# Patient Record
Sex: FEMALE | Race: NATIVE HAWAIIAN OR OTHER PACIFIC ISLANDER | NOT HISPANIC OR LATINO | Employment: UNEMPLOYED | URBAN - METROPOLITAN AREA
[De-identification: names, ages, dates, MRNs, and addresses within clinical notes are randomized per-mention and may not be internally consistent; named-entity substitution may affect disease eponyms.]

---

## 2017-01-03 ENCOUNTER — HOSPITAL ENCOUNTER (OUTPATIENT)
Dept: LAB | Facility: MEDICAL CENTER | Age: 67
End: 2017-01-03
Attending: NURSE PRACTITIONER
Payer: MEDICARE

## 2017-01-03 ENCOUNTER — OFFICE VISIT (OUTPATIENT)
Dept: MEDICAL GROUP | Facility: MEDICAL CENTER | Age: 67
End: 2017-01-03
Payer: MEDICARE

## 2017-01-03 VITALS
HEIGHT: 55 IN | RESPIRATION RATE: 16 BRPM | DIASTOLIC BLOOD PRESSURE: 72 MMHG | TEMPERATURE: 97.8 F | HEART RATE: 60 BPM | WEIGHT: 126 LBS | SYSTOLIC BLOOD PRESSURE: 110 MMHG | BODY MASS INDEX: 29.16 KG/M2 | OXYGEN SATURATION: 97 %

## 2017-01-03 DIAGNOSIS — R53.83 OTHER FATIGUE: ICD-10-CM

## 2017-01-03 DIAGNOSIS — R60.0 EDEMA OF LEFT LOWER EXTREMITY: ICD-10-CM

## 2017-01-03 DIAGNOSIS — R10.31 RIGHT LOWER QUADRANT ABDOMINAL PAIN: ICD-10-CM

## 2017-01-03 DIAGNOSIS — R30.0 DYSURIA: ICD-10-CM

## 2017-01-03 DIAGNOSIS — I63.81 LACUNAR INFARCTION (HCC): ICD-10-CM

## 2017-01-03 LAB
ALBUMIN SERPL BCP-MCNC: 4.2 G/DL (ref 3.2–4.9)
ALBUMIN/GLOB SERPL: 1.4 G/DL
ALP SERPL-CCNC: 72 U/L (ref 30–99)
ALT SERPL-CCNC: 18 U/L (ref 2–50)
ANION GAP SERPL CALC-SCNC: 9 MMOL/L (ref 0–11.9)
APPEARANCE UR: CLEAR
AST SERPL-CCNC: 21 U/L (ref 12–45)
BACTERIA #/AREA URNS HPF: ABNORMAL /HPF
BASOPHILS # BLD AUTO: 0.03 K/UL (ref 0–0.12)
BASOPHILS NFR BLD AUTO: 0.4 % (ref 0–1.8)
BILIRUB SERPL-MCNC: 0.6 MG/DL (ref 0.1–1.5)
BILIRUB UR QL STRIP.AUTO: NEGATIVE
BUN SERPL-MCNC: 12 MG/DL (ref 8–22)
CALCIUM SERPL-MCNC: 9.5 MG/DL (ref 8.5–10.5)
CHLORIDE SERPL-SCNC: 107 MMOL/L (ref 96–112)
CO2 SERPL-SCNC: 23 MMOL/L (ref 20–33)
COLOR UR AUTO: COLORLESS
CREAT SERPL-MCNC: 0.55 MG/DL (ref 0.5–1.4)
CULTURE IF INDICATED INDCX: YES UA CULTURE
EOSINOPHIL # BLD: 0.04 K/UL (ref 0–0.51)
EOSINOPHIL NFR BLD AUTO: 0.5 % (ref 0–6.9)
EPITHELIAL CELLS 1715: ABNORMAL /HPF
ERYTHROCYTE [DISTWIDTH] IN BLOOD BY AUTOMATED COUNT: 44.1 FL (ref 35.9–50)
GLOBULIN SER CALC-MCNC: 2.9 G/DL (ref 1.9–3.5)
GLUCOSE SERPL-MCNC: 137 MG/DL (ref 65–99)
GLUCOSE UR STRIP.AUTO-MCNC: NEGATIVE MG/DL
HCT VFR BLD AUTO: 44.9 % (ref 37–47)
HGB BLD-MCNC: 15.1 G/DL (ref 12–16)
IMM GRANULOCYTES # BLD AUTO: 0.03 K/UL (ref 0–0.11)
IMM GRANULOCYTES NFR BLD AUTO: 0.4 % (ref 0–0.9)
KETONES UR STRIP.AUTO-MCNC: NEGATIVE MG/DL
LEUKOCYTE ESTERASE UR QL STRIP.AUTO: ABNORMAL
LYMPHOCYTES # BLD: 1.37 K/UL (ref 1–4.8)
LYMPHOCYTES NFR BLD AUTO: 16.7 % (ref 22–41)
MCH RBC QN AUTO: 29.5 PG (ref 27–33)
MCHC RBC AUTO-ENTMCNC: 33.6 G/DL (ref 33.6–35)
MCV RBC AUTO: 87.7 FL (ref 81.4–97.8)
MICRO URNS: ABNORMAL
MONOCYTES # BLD: 0.55 K/UL (ref 0–0.85)
MONOCYTES NFR BLD AUTO: 6.7 % (ref 0–13.4)
NEUTROPHILS # BLD: 6.16 K/UL (ref 2–7.15)
NEUTROPHILS NFR BLD AUTO: 75.3 % (ref 44–72)
NITRITE UR QL STRIP.AUTO: NEGATIVE
NRBC # BLD AUTO: 0 K/UL
NRBC BLD-RTO: 0 /100 WBC
PH UR: 7 [PH]
PLATELET # BLD AUTO: 202 K/UL (ref 164–446)
PMV BLD AUTO: 10.6 FL (ref 9–12.9)
POTASSIUM SERPL-SCNC: 3.7 MMOL/L (ref 3.6–5.5)
PROT SERPL-MCNC: 7.1 G/DL (ref 6–8.2)
PROT UR QL STRIP: NEGATIVE MG/DL
RBC # BLD AUTO: 5.12 M/UL (ref 4.2–5.4)
RBC #/AREA URNS HPF: ABNORMAL /HPF
RBC UR QL AUTO: NEGATIVE
SODIUM SERPL-SCNC: 139 MMOL/L (ref 135–145)
SP GR UR STRIP.AUTO: 1
TRANS CELLS URNS QL MICRO: ABNORMAL /HPF
TSH SERPL DL<=0.005 MIU/L-ACNC: 1.58 UIU/ML (ref 0.3–3.7)
WBC # BLD AUTO: 8.2 K/UL (ref 4.8–10.8)
WBC #/AREA URNS HPF: ABNORMAL /HPF

## 2017-01-03 PROCEDURE — 99214 OFFICE O/P EST MOD 30 MIN: CPT | Performed by: NURSE PRACTITIONER

## 2017-01-03 PROCEDURE — 87086 URINE CULTURE/COLONY COUNT: CPT

## 2017-01-03 PROCEDURE — 84443 ASSAY THYROID STIM HORMONE: CPT

## 2017-01-03 PROCEDURE — 80053 COMPREHEN METABOLIC PANEL: CPT

## 2017-01-03 PROCEDURE — 36415 COLL VENOUS BLD VENIPUNCTURE: CPT

## 2017-01-03 PROCEDURE — 85025 COMPLETE CBC W/AUTO DIFF WBC: CPT

## 2017-01-03 PROCEDURE — 81001 URINALYSIS AUTO W/SCOPE: CPT

## 2017-01-03 ASSESSMENT — ENCOUNTER SYMPTOMS
EDEMA: 1
ABDOMINAL PAIN: 1

## 2017-01-03 NOTE — MR AVS SNAPSHOT
"        Jodee Basilio   1/3/2017 3:40 PM   Office Visit   MRN: [id number]    Department:  [address] [place] [address]   Dept Phone:  [phone]    Description:  Female : 1950   Provider:  FIOR Lieberman           Reason for Visit     Edema left leg x 1 week    Other chest weezing       Allergies as of 1/3/2017     No Known Allergies      You were diagnosed with     Right lower quadrant abdominal pain   [777792]       Edema of left lower extremity   [028188]       Dysuria   [788.1.ICD-9-CM]       Lacunar infarction (HCC)   [263924]       Other fatigue   [4611648]         Vital Signs     Blood Pressure Pulse Temperature Respirations Height Weight    110/72 mmHg 60 36.6 °C (97.8 °F) 16 1.397 m (4' 7\") 57.153 kg (126 lb)    Body Mass Index Oxygen Saturation Smoking Status             29.29 kg/m2 97% Never Smoker          Basic Information     Date Of Birth Sex Race Ethnicity Preferred Language    1950 Female  or other  Non- English      Your appointments     2017  3:00 PM   Established Patient with Crystal Cantor M.D.   [facility] [address]    [address] [facility] [address]  [facility] [id number] [phone]           You will be receiving a confirmation call a few days before your appointment from our automated call confirmation system.              Problem List              ICD-10-CM Priority Class Noted - Resolved    Hallux valgus, acquired M20.10   2014 - Present    Developmental delay (Chronic) R62.50   2014 - Present    Reactive airway disease (Chronic) J45.909   2015 - Present    Hyperlipidemia (Chronic) E78.5   2015 - Present    Gastroesophageal reflux disease without esophagitis (Chronic) K21.9   2015 - Present    Episode of jerking (Chronic) R25.3   3/9/2016 - Present    Weakness of both lower extremities M62.81   3/9/2016 - Present    Thrombocytopenia (HCC) D69.6   3/13/2016 - Present   " Urinary retention R33.9   3/13/2016 - Present    Lacunar infarction (HCC) I63.9   3/18/2016 - Present    Vascular dementia F01.50   3/18/2016 - Present      Health Maintenance        Date Due Completion Dates    IMM DTaP/Tdap/Td Vaccine (1 - Tdap) 6/25/1969 ---    IMM ZOSTER VACCINE 6/25/2010 ---    MAMMOGRAM 8/19/2017 8/19/2016, 8/4/2015, 4/1/2004    IMM PNEUMOCOCCAL 65+ (ADULT) LOW/MEDIUM RISK SERIES (2 of 2 - PPSV23) 12/29/2019 3/16/2016, 12/29/2014    BONE DENSITY 12/18/2020 12/18/2015 (Postponed)    Override on 12/18/2015: Postponed (Patient's sister will discuss test with PCP)    COLONOSCOPY 12/18/2025 12/18/2015 (Postponed)    Override on 12/18/2015: Postponed (Patient's daughter states colonoscopy done 5 years ago in [place], [place])            Current Immunizations     13-VALENT PCV PREVNAR 3/16/2016  3:00 PM    Influenza Vaccine Adult HD 10/7/2016    Influenza Vaccine Quad Inj (Preserved) 11/18/2015    Pneumococcal polysaccharide vaccine (PPSV-23) 12/29/2014  6:02 PM    Tuberculin Skin Test 6/16/2016  9:00 AM      Below and/or attached are the medications your provider expects you to take. Review all of your home medications and newly ordered medications with your provider and/or pharmacist. Follow medication instructions as directed by your provider and/or pharmacist. Please keep your medication list with you and share with your provider. Update the information when medications are discontinued, doses are changed, or new medications (including over-the-counter products) are added; and carry medication information at all times in the event of emergency situations     Allergies:  No Known Allergies          Medications  Valid as of: January 03, 2017 -  4:15 PM    Generic Name Brand Name Tablet Size Instructions for use    Ascorbic Acid (Tab) Vitamin C 1000 MG Take 1 Tab by mouth every day.        Aspirin (Tablet Delayed Response) ECOTRIN 81 MG Take 1 Tab by mouth every day.        Atorvastatin Calcium (Tab)  LIPITOR 20 MG Take 1 Tab by mouth every day.        Benzonatate (Cap) TESSALON 100 MG Take 100 mg by mouth 3 times a day as needed for Cough.        Budesonide-Formoterol Fumarate (Aerosol) SYMBICORT 80-4.5 MCG/ACT Inhale 2 Puffs by mouth 2 Times a Day.        Cranberry (Cap) Cranberry 500 MG Take 1 Cap by mouth as needed.        Multiple Minerals-Vitamins   Take 1 Tab by mouth every day.        Multiple Vitamins-Minerals (Tab) MULTIVITAL PERFORMANCE  Take 1 Tab by mouth every day.        OLANZapine (Tab) ZYPREXA 2.5 MG Take 1 Tab by mouth every evening.        OLANZapine (Tab) ZYPREXA 5 MG take 2.5 mg in the evening.        Omeprazole (CAPSULE DELAYED RELEASE) PRILOSEC 20 MG Take 1 Cap by mouth every day.        Sennosides-Docusate Sodium (Tab) SENOKOT-S 8.6-50 MG Take 1 Tab by mouth every day.        .                 Medicines prescribed today were sent to:     [facility] [address] [place] [address]), [place] - [id number] () [place] [address]    Phone: [phone] Fax: [phone]    Open 24 Hours?: No      Medication refill instructions:       If your prescription bottle indicates you have medication refills left, it is not necessary to call your provider’s office. Please contact your pharmacy and they will refill your medication.    If your prescription bottle indicates you do not have any refills left, you may request refills at any time through one of the following ways: The online [employer] system (except Urgent Care), by calling your provider’s office, or by asking your pharmacy to contact your provider’s office with a refill request. Medication refills are processed only during regular business hours and may not be available until the next business day. Your provider may request additional information or to have a follow-up visit with you prior to refilling your medication.   *Please Note: Medication refills are assigned a new Rx number when refilled electronically. Your pharmacy may  indicate that no refills were authorized even though a new prescription for the same medication is available at the pharmacy. Please request the medicine by name with the pharmacy before contacting your provider for a refill.        Your To Do List     Future Labs/Procedures Complete By Expires    CBC WITH DIFFERENTIAL  As directed 1/4/2018    COMP METABOLIC PANEL  As directed 1/4/2018    TSH  As directed 1/4/2018    URINALYSIS,CULTURE IF INDICATED  As directed 1/3/2018    US-ABDOMEN COMPLETE SURVEY  As directed 7/6/2017    US-EXTREMITY VENOUS UNILATERAL-LOWER LEFT  As directed 1/3/2018         MyChart Access Code: Activation code not generated  Current [employer] Status: Active

## 2017-01-04 LAB
BACTERIA UR CULT: NORMAL
SIGNIFICANT IND 70042: NORMAL
SOURCE SOURCE: NORMAL

## 2017-01-04 RX ORDER — CIPROFLOXACIN 250 MG/1
250 TABLET, FILM COATED ORAL 2 TIMES DAILY
Qty: 10 TAB | Refills: 0 | Status: SHIPPED | OUTPATIENT
Start: 2017-01-04 | End: 2017-01-09

## 2017-01-04 NOTE — PROGRESS NOTES
Subjective:      Jodee Basilio is a 66 y.o. female who presents with Edema and Other            Edema  Associated symptoms include abdominal pain.   Other  Associated symptoms include abdominal pain.   Jodee Basilio is a patient of  brought in by her sister/caregiver today for a number of issues.      1. Right lower quadrant abdominal pain  Patient has history of lacunar infarct and developmental delay so most of the information provided today was by her sister. She states when she cares for the patient she sometimes complains of pain in the right abdomen area so she would like an ultrasound. There has been no reports of constipation or diarrhea. The pain does not seem to be constant and has probably been present for at least a month.    2. Edema of left lower extremity  The caregiver has noticed some swelling of the left foot which comes and goes. It may also sometimes affect the left lower leg. Patient is immobilized due to her lacunar infarct although she is walked frequently by her sister. She is brought in today by wheelchair. There is no reports of hemoptysis, shortness of breath, tachycardia or cough.    3. Dysuria  Patient had a positive urinalysis for strep bovis on October 23 which responded to penicillin. Her caregiver would like to have this rechecked. Patient complains of multiple pains so it isn't clear whether she truly has dysuria.    4. Lacunar infarction (HCC)  Patient has been through therapy since she had her lacunar infarct early in 2016. She is laughing in the office today with her caregiver and she wears braces to help with mobility. Her weakness seems to affect mostly her left side.    5. Other fatigue  Caregiver states patient complains of fatigue all the time and she is due for lab work.      Social History   Substance Use Topics   • Smoking status: Never Smoker    • Smokeless tobacco: Never Used   • Alcohol Use: No     Current Outpatient Prescriptions   Medication  "Sig Dispense Refill   • benzonatate (TESSALON) 100 MG Cap Take 100 mg by mouth 3 times a day as needed for Cough.     • budesonide-formoterol (SYMBICORT) 80-4.5 MCG/ACT Aerosol Inhale 2 Puffs by mouth 2 Times a Day. 1 Inhaler 5   • atorvastatin (LIPITOR) 20 MG Tab Take 1 Tab by mouth every day. 90 Tab 2   • omeprazole (PRILOSEC) 20 MG delayed-release capsule Take 1 Cap by mouth every day. 30 Cap 11   • aspirin EC (ECOTRIN) 81 MG Tablet Delayed Response Take 1 Tab by mouth every day. 365 Tab 0   • Cranberry 500 MG Cap Take 1 Cap by mouth as needed.     • Ascorbic Acid (VITAMIN C) 1000 MG TABS Take 1 Tab by mouth every day.     • Multiple Vitamins-Minerals (MULTIVITAL PERFORMANCE) TABS Take 1 Tab by mouth every day.     • olanzapine (ZYPREXA) 5 MG Tab take 2.5 mg in the evening. 15 Tab 3   • olanzapine (ZYPREXA) 2.5 MG Tab Take 1 Tab by mouth every evening. 30 Tab 11   • sennosides-docusate sodium (SENOKOT-S) 8.6-50 MG tablet Take 1 Tab by mouth every day. 30 Tab 11   • Multiple Minerals-Vitamins (CALCIUM & VIT D3 BONE HEALTH PO) Take 1 Tab by mouth every day.       No current facility-administered medications for this visit.     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   Cardiovascular: Positive for leg swelling.   Gastrointestinal: Positive for abdominal pain.   Genitourinary: Positive for dysuria.   All other systems reviewed and are negative.         Objective:     /72 mmHg  Pulse 60  Temp(Src) 36.6 °C (97.8 °F)  Resp 16  Ht 1.397 m (4' 7\")  Wt 57.153 kg (126 lb)  BMI 29.29 kg/m2  SpO2 97%     Physical Exam "   Constitutional: She is oriented to person, place, and time. She appears well-developed and well-nourished. No distress.   HENT:   Head: Normocephalic and atraumatic.   Right Ear: External ear normal.   Left Ear: External ear normal.   Nose: Nose normal.   Eyes: Right eye exhibits no discharge. Left eye exhibits no discharge.   Neck: Normal range of motion. Neck supple. No thyromegaly present.   Cardiovascular: Normal rate, regular rhythm and normal heart sounds.  Exam reveals no gallop and no friction rub.    No murmur heard.  Mild edema of the left foot and pulses are present. There is no ulceration or redness. No pinpoint tenderness.   Pulmonary/Chest: Effort normal and breath sounds normal. She has no wheezes. She has no rales.   Musculoskeletal: She exhibits no edema or tenderness.   Neurological: She is alert and oriented to person, place, and time. She displays normal reflexes.   Generalized weakness and patient needs assistance with getting in and out of wheelchair.   Skin: Skin is warm and dry. No rash noted. She is not diaphoretic.   Psychiatric: She has a normal mood and affect. Her behavior is normal. Judgment and thought content normal.   Patient laughing with caregiver but unable to provide any information and has some difficulty following requests.   Nursing note and vitals reviewed.              Assessment/Plan:     1. Right lower quadrant abdominal pain  I have ordered an ultrasound as patient's caregiver requested a lot of this might be related to her urination or musculoskeletal. I advised ER visit if pain becomes severe. This appears to be a long-term ongoing problem.  - US-ABDOMEN COMPLETE SURVEY; Future    2. Edema of left lower extremity  Patient shows no obvious DVT but because she is immobilized I will do an ultrasound to rule this out. I advised elevation of the foot and better mobility to prevent DVTs.  - US-EXTREMITY VENOUS UNILATERAL-LOWER LEFT; Future    3. Dysuria  Patient unable to  give us a urine sample in the office so she will go to the lab for this.  - URINALYSIS,CULTURE IF INDICATED; Future    4. Lacunar infarction (HCC)  Patient's sister will continue to try to walk patient an exerciser frequently. They did not want PT.    5. Other fatigue    - COMP METABOLIC PANEL; Future  - TSH; Future  - CBC WITH DIFFERENTIAL; Future

## 2017-01-16 ENCOUNTER — OFFICE VISIT (OUTPATIENT)
Dept: MEDICAL GROUP | Facility: MEDICAL CENTER | Age: 67
End: 2017-01-16
Payer: MEDICARE

## 2017-01-16 VITALS
HEART RATE: 76 BPM | RESPIRATION RATE: 14 BRPM | TEMPERATURE: 98.1 F | DIASTOLIC BLOOD PRESSURE: 64 MMHG | OXYGEN SATURATION: 91 % | SYSTOLIC BLOOD PRESSURE: 112 MMHG | HEIGHT: 55 IN

## 2017-01-16 DIAGNOSIS — N39.0 URINARY TRACT INFECTION, SITE UNSPECIFIED: ICD-10-CM

## 2017-01-16 DIAGNOSIS — R40.0 SOMNOLENCE: ICD-10-CM

## 2017-01-16 DIAGNOSIS — E66.9 OBESITY (BMI 30-39.9): ICD-10-CM

## 2017-01-16 LAB
APPEARANCE UR: CLEAR
BILIRUB UR STRIP-MCNC: NORMAL MG/DL
COLOR UR AUTO: YELLOW
GLUCOSE UR STRIP.AUTO-MCNC: NORMAL MG/DL
KETONES UR STRIP.AUTO-MCNC: NORMAL MG/DL
LEUKOCYTE ESTERASE UR QL STRIP.AUTO: NORMAL
NITRITE UR QL STRIP.AUTO: NORMAL
PH UR STRIP.AUTO: 6 [PH] (ref 5–8)
PROT UR QL STRIP: NORMAL MG/DL
RBC UR QL AUTO: NORMAL
SP GR UR STRIP.AUTO: 1
UROBILINOGEN UR STRIP-MCNC: NORMAL MG/DL

## 2017-01-16 PROCEDURE — 99214 OFFICE O/P EST MOD 30 MIN: CPT | Performed by: FAMILY MEDICINE

## 2017-01-16 PROCEDURE — 81002 URINALYSIS NONAUTO W/O SCOPE: CPT | Performed by: FAMILY MEDICINE

## 2017-01-17 ENCOUNTER — TELEPHONE (OUTPATIENT)
Dept: MEDICAL GROUP | Facility: MEDICAL CENTER | Age: 67
End: 2017-01-17

## 2017-01-17 NOTE — PROGRESS NOTES
CC: seems sleep most of the time    HPI:   Jodee presents today brought by her care giver ( sister) because she noticed that the patient has been sleepy and drowsy most of the time. No cough, fever, or SOB. In the past when she had the same symptoms she usually found delirious because of the UTI. Has been having normal urination , no abdominal pain, no change in the color or smell of her urine. Has been having normal appetite.    Overweight, her BMI is 29.29.Has severe dementia, has been on a wheel chair (has CP), however her care giver( her sister) advised to keep patient's diet as healthy as possible ( low carb, salt, and fat diet)      Patient Active Problem List    Diagnosis Date Noted   • [place] 03/18/2016   • Vascular dementia 03/18/2016   • Thrombocytopenia (CMS-HCC) 03/13/2016   • Urinary retention 03/13/2016   • Episode of jerking 03/09/2016   • Weakness of both lower extremities 03/09/2016   • Reactive airway disease 08/17/2015   • Hyperlipidemia 08/17/2015   • Gastroesophageal reflux disease without esophagitis 08/17/2015   • Developmental delay 11/17/2014   • Hallux valgus, acquired 06/11/2014       Current Outpatient Prescriptions   Medication Sig Dispense Refill   • olanzapine (ZYPREXA) 5 MG Tab take 2.5 mg in the evening. 15 Tab 3   • olanzapine (ZYPREXA) 2.5 MG Tab Take 1 Tab by mouth every evening. 30 Tab 11   • sennosides-docusate sodium (SENOKOT-S) 8.6-50 MG tablet Take 1 Tab by mouth every day. 30 Tab 11   • benzonatate (TESSALON) 100 MG Cap Take 100 mg by mouth 3 times a day as needed for Cough.     • budesonide-formoterol (SYMBICORT) 80-4.5 MCG/ACT Aerosol Inhale 2 Puffs by mouth 2 Times a Day. 1 Inhaler 5   • atorvastatin (LIPITOR) 20 MG Tab Take 1 Tab by mouth every day. 90 Tab 2   • omeprazole (PRILOSEC) 20 MG delayed-release capsule Take 1 Cap by mouth every day. 30 Cap 11   • aspirin EC (ECOTRIN) 81 MG Tablet Delayed Response Take 1 Tab by mouth every day. 365 Tab 0  "  • Cranberry 500 MG Cap Take 1 Cap by mouth as needed.     • Multiple Minerals-Vitamins (CALCIUM & VIT D3 BONE HEALTH PO) Take 1 Tab by mouth every day.     • Ascorbic Acid (VITAMIN C) 1000 MG TABS Take 1 Tab by mouth every day.     • Multiple Vitamins-Minerals (MULTIVITAL PERFORMANCE) TABS Take 1 Tab by mouth every day.       No current facility-administered medications for this visit.         Allergies as of 01/16/2017   • (No Known Allergies)        ROS: Denies any chest pain, Shortness of breath, Changes bowel or bladder, Lower extremity edema.    Physical Exam:  /64 mmHg  Pulse 76  Temp(Src) 36.7 °C (98.1 °F)  Resp 14  Ht 1.397 m (4' 7\")  SpO2 91%  Gen.: Well-developed, well-nourished, no apparent distress,pleasant and cooperative with the examination. Demented, in a wheelchair.  Skin:  Warm and dry with good turgor.   Cor: Regular rate and rhythm without murmur, gallop or rub.  Lungs: Respirations unlabored.Clear to auscultation with equal breath sounds bilaterally. No wheezes, rhonchi.  Abdomen: Soft, NT, ND, no CVA tenderness.        Assessment and Plan.   66 y.o. female     1. Somnolence  UTI is ruled out.UA showed no sign of UTI.  However caregiver advised to keep the patient hydrated. CBC, CMP showed normal WBCs, and kidney function respectively.Has normal oxygen saturation  Advised to use Zyprexa only as needed instead of every night( patient has been feeling sleep most of the time).    - POCT Urinalysis    2. Overweight  BMI 29.29.  Care giver advised to keep patient's diet as healthy as possible ( low carb, salt, and fat diet)          "

## 2017-01-17 NOTE — TELEPHONE ENCOUNTER
"VOICEMAIL  1. Caller Name: Pt's sister                      Call Back Number: [phone] (home) [phone] (work)    2. Message: Pt's sister states has already talked to you about this.  Jodee is \"falling asleep all the time\" and thinks it best to do a sleep study    3. Patient approves office to leave a detailed voicemail/MyChart message: yes    "

## 2017-01-23 ENCOUNTER — APPOINTMENT (OUTPATIENT)
Dept: RADIOLOGY | Facility: MEDICAL CENTER | Age: 67
End: 2017-01-23
Attending: NURSE PRACTITIONER
Payer: MEDICARE

## 2017-01-30 ENCOUNTER — APPOINTMENT (OUTPATIENT)
Dept: RADIOLOGY | Facility: MEDICAL CENTER | Age: 67
DRG: 202 | End: 2017-01-30
Attending: EMERGENCY MEDICINE
Payer: MEDICARE

## 2017-01-30 ENCOUNTER — HOSPITAL ENCOUNTER (INPATIENT)
Facility: MEDICAL CENTER | Age: 67
LOS: 1 days | DRG: 202 | End: 2017-02-01
Attending: EMERGENCY MEDICINE | Admitting: HOSPITALIST
Payer: MEDICARE

## 2017-01-30 ENCOUNTER — RESOLUTE PROFESSIONAL BILLING HOSPITAL PROF FEE (OUTPATIENT)
Dept: HOSPITALIST | Facility: MEDICAL CENTER | Age: 67
End: 2017-01-30
Payer: MEDICARE

## 2017-01-30 DIAGNOSIS — R06.00 DYSPNEA, UNSPECIFIED TYPE: ICD-10-CM

## 2017-01-30 DIAGNOSIS — R09.02 HYPOXIA: ICD-10-CM

## 2017-01-30 DIAGNOSIS — F01.50 VASCULAR DEMENTIA WITHOUT BEHAVIORAL DISTURBANCE (HCC): ICD-10-CM

## 2017-01-30 DIAGNOSIS — J98.01 BRONCHOSPASM: ICD-10-CM

## 2017-01-30 LAB
ALBUMIN SERPL BCP-MCNC: 3.6 G/DL (ref 3.2–4.9)
ALBUMIN/GLOB SERPL: 1.1 G/DL
ALP SERPL-CCNC: 70 U/L (ref 30–99)
ALT SERPL-CCNC: 23 U/L (ref 2–50)
ANION GAP SERPL CALC-SCNC: 8 MMOL/L (ref 0–11.9)
APTT PPP: 39.4 SEC (ref 24.7–36)
AST SERPL-CCNC: 29 U/L (ref 12–45)
BASOPHILS # BLD AUTO: 0.2 % (ref 0–1.8)
BASOPHILS # BLD: 0.01 K/UL (ref 0–0.12)
BILIRUB SERPL-MCNC: 0.5 MG/DL (ref 0.1–1.5)
BNP SERPL-MCNC: 15 PG/ML (ref 0–100)
BUN SERPL-MCNC: 8 MG/DL (ref 8–22)
CALCIUM SERPL-MCNC: 8.2 MG/DL (ref 8.4–10.2)
CHLORIDE SERPL-SCNC: 106 MMOL/L (ref 96–112)
CO2 SERPL-SCNC: 26 MMOL/L (ref 20–33)
CREAT SERPL-MCNC: 0.69 MG/DL (ref 0.5–1.4)
EOSINOPHIL # BLD AUTO: 0.12 K/UL (ref 0–0.51)
EOSINOPHIL NFR BLD: 2.5 % (ref 0–6.9)
ERYTHROCYTE [DISTWIDTH] IN BLOOD BY AUTOMATED COUNT: 42.2 FL (ref 35.9–50)
FLUAV+FLUBV AG SPEC QL IA: NORMAL
GFR SERPL CREATININE-BSD FRML MDRD: >60 ML/MIN/1.73 M 2
GLOBULIN SER CALC-MCNC: 3.2 G/DL (ref 1.9–3.5)
GLUCOSE SERPL-MCNC: 164 MG/DL (ref 65–99)
HCT VFR BLD AUTO: 43.6 % (ref 37–47)
HGB BLD-MCNC: 14.6 G/DL (ref 12–16)
IMM GRANULOCYTES # BLD AUTO: 0.01 K/UL (ref 0–0.11)
IMM GRANULOCYTES NFR BLD AUTO: 0.2 % (ref 0–0.9)
INR PPP: 1.35 (ref 0.87–1.13)
LACTATE BLD-SCNC: 2.28 MMOL/L (ref 0.5–2)
LYMPHOCYTES # BLD AUTO: 0.74 K/UL (ref 1–4.8)
LYMPHOCYTES NFR BLD: 15.3 % (ref 22–41)
MCH RBC QN AUTO: 29.3 PG (ref 27–33)
MCHC RBC AUTO-ENTMCNC: 33.5 G/DL (ref 33.6–35)
MCV RBC AUTO: 87.4 FL (ref 81.4–97.8)
MONOCYTES # BLD AUTO: 0.46 K/UL (ref 0–0.85)
MONOCYTES NFR BLD AUTO: 9.5 % (ref 0–13.4)
NEUTROPHILS # BLD AUTO: 3.49 K/UL (ref 2–7.15)
NEUTROPHILS NFR BLD: 72.3 % (ref 44–72)
NRBC # BLD AUTO: 0 K/UL
NRBC BLD AUTO-RTO: 0 /100 WBC
PLATELET # BLD AUTO: 145 K/UL (ref 164–446)
PMV BLD AUTO: 9.9 FL (ref 9–12.9)
POTASSIUM SERPL-SCNC: 3.2 MMOL/L (ref 3.6–5.5)
PROT SERPL-MCNC: 6.8 G/DL (ref 6–8.2)
PROTHROMBIN TIME: 16.5 SEC (ref 12–14.6)
RBC # BLD AUTO: 4.99 M/UL (ref 4.2–5.4)
SIGNIFICANT IND 70042: NORMAL
SITE SITE: NORMAL
SODIUM SERPL-SCNC: 140 MMOL/L (ref 135–145)
SOURCE SOURCE: NORMAL
SPECIMEN DRAWN FROM PATIENT: ABNORMAL
TROPONIN I SERPL-MCNC: <0.02 NG/ML (ref 0–0.04)
WBC # BLD AUTO: 4.8 K/UL (ref 4.8–10.8)

## 2017-01-30 PROCEDURE — 85025 COMPLETE CBC W/AUTO DIFF WBC: CPT

## 2017-01-30 PROCEDURE — 85730 THROMBOPLASTIN TIME PARTIAL: CPT

## 2017-01-30 PROCEDURE — 83605 ASSAY OF LACTIC ACID: CPT

## 2017-01-30 PROCEDURE — 700105 HCHG RX REV CODE 258: Performed by: EMERGENCY MEDICINE

## 2017-01-30 PROCEDURE — G0378 HOSPITAL OBSERVATION PER HR: HCPCS

## 2017-01-30 PROCEDURE — 304562 HCHG STAT O2 MASK/CANNULA

## 2017-01-30 PROCEDURE — 84484 ASSAY OF TROPONIN QUANT: CPT

## 2017-01-30 PROCEDURE — 99285 EMERGENCY DEPT VISIT HI MDM: CPT

## 2017-01-30 PROCEDURE — 87400 INFLUENZA A/B EACH AG IA: CPT

## 2017-01-30 PROCEDURE — 36415 COLL VENOUS BLD VENIPUNCTURE: CPT

## 2017-01-30 PROCEDURE — 700101 HCHG RX REV CODE 250: Performed by: EMERGENCY MEDICINE

## 2017-01-30 PROCEDURE — 96360 HYDRATION IV INFUSION INIT: CPT

## 2017-01-30 PROCEDURE — 83880 ASSAY OF NATRIURETIC PEPTIDE: CPT

## 2017-01-30 PROCEDURE — 93005 ELECTROCARDIOGRAM TRACING: CPT | Performed by: EMERGENCY MEDICINE

## 2017-01-30 PROCEDURE — 71010 DX-CHEST-PORTABLE (1 VIEW): CPT

## 2017-01-30 PROCEDURE — 87040 BLOOD CULTURE FOR BACTERIA: CPT

## 2017-01-30 PROCEDURE — 304561 HCHG STAT O2

## 2017-01-30 PROCEDURE — 99220 PR INITIAL OBSERVATION CARE,LEVL III: CPT | Performed by: HOSPITALIST

## 2017-01-30 PROCEDURE — 700101 HCHG RX REV CODE 250: Performed by: HOSPITALIST

## 2017-01-30 PROCEDURE — 85610 PROTHROMBIN TIME: CPT

## 2017-01-30 PROCEDURE — 94640 AIRWAY INHALATION TREATMENT: CPT

## 2017-01-30 PROCEDURE — 94760 N-INVAS EAR/PLS OXIMETRY 1: CPT

## 2017-01-30 PROCEDURE — 80053 COMPREHEN METABOLIC PANEL: CPT

## 2017-01-30 RX ORDER — CODEINE PHOSPHATE AND GUAIFENESIN 10; 100 MG/5ML; MG/5ML
5 SOLUTION ORAL EVERY 4 HOURS PRN
Status: ON HOLD | COMMUNITY
End: 2017-02-01

## 2017-01-30 RX ORDER — GUAIFENESIN/DEXTROMETHORPHAN 100-10MG/5
5 SYRUP ORAL EVERY 6 HOURS PRN
Status: DISCONTINUED | OUTPATIENT
Start: 2017-01-30 | End: 2017-01-31

## 2017-01-30 RX ORDER — AMOXICILLIN 250 MG
1 CAPSULE ORAL NIGHTLY
Status: DISCONTINUED | OUTPATIENT
Start: 2017-01-31 | End: 2017-01-31

## 2017-01-30 RX ORDER — SODIUM CHLORIDE AND POTASSIUM CHLORIDE 150; 900 MG/100ML; MG/100ML
INJECTION, SOLUTION INTRAVENOUS CONTINUOUS
Status: DISCONTINUED | OUTPATIENT
Start: 2017-01-30 | End: 2017-01-31

## 2017-01-30 RX ORDER — LACTULOSE 20 G/30ML
30 SOLUTION ORAL
Status: DISCONTINUED | OUTPATIENT
Start: 2017-01-30 | End: 2017-01-31

## 2017-01-30 RX ORDER — OMEPRAZOLE 20 MG/1
20 CAPSULE, DELAYED RELEASE ORAL DAILY
Status: DISCONTINUED | OUTPATIENT
Start: 2017-01-31 | End: 2017-01-31

## 2017-01-30 RX ORDER — OMEPRAZOLE 20 MG/1
20 CAPSULE, DELAYED RELEASE ORAL DAILY
Status: ON HOLD | COMMUNITY
End: 2017-02-01

## 2017-01-30 RX ORDER — ONDANSETRON 4 MG/1
4 TABLET, ORALLY DISINTEGRATING ORAL EVERY 4 HOURS PRN
Status: DISCONTINUED | OUTPATIENT
Start: 2017-01-30 | End: 2017-02-01 | Stop reason: HOSPADM

## 2017-01-30 RX ORDER — ENEMA 19; 7 G/133ML; G/133ML
1 ENEMA RECTAL
Status: DISCONTINUED | OUTPATIENT
Start: 2017-01-30 | End: 2017-01-31

## 2017-01-30 RX ORDER — SODIUM CHLORIDE 9 MG/ML
1000 INJECTION, SOLUTION INTRAVENOUS ONCE
Status: COMPLETED | OUTPATIENT
Start: 2017-01-30 | End: 2017-01-30

## 2017-01-30 RX ORDER — DOCUSATE SODIUM 100 MG/1
100 CAPSULE, LIQUID FILLED ORAL EVERY MORNING
Status: DISCONTINUED | OUTPATIENT
Start: 2017-01-31 | End: 2017-01-31

## 2017-01-30 RX ORDER — ONDANSETRON 2 MG/ML
4 INJECTION INTRAMUSCULAR; INTRAVENOUS EVERY 4 HOURS PRN
Status: DISCONTINUED | OUTPATIENT
Start: 2017-01-30 | End: 2017-02-01 | Stop reason: HOSPADM

## 2017-01-30 RX ORDER — BUDESONIDE AND FORMOTEROL FUMARATE DIHYDRATE 80; 4.5 UG/1; UG/1
2 AEROSOL RESPIRATORY (INHALATION) 2 TIMES DAILY
Status: DISCONTINUED | OUTPATIENT
Start: 2017-01-30 | End: 2017-01-31 | Stop reason: ALTCHOICE

## 2017-01-30 RX ORDER — IPRATROPIUM BROMIDE AND ALBUTEROL SULFATE 2.5; .5 MG/3ML; MG/3ML
3 SOLUTION RESPIRATORY (INHALATION)
Status: DISCONTINUED | OUTPATIENT
Start: 2017-01-31 | End: 2017-01-31

## 2017-01-30 RX ORDER — BISACODYL 10 MG
10 SUPPOSITORY, RECTAL RECTAL
Status: DISCONTINUED | OUTPATIENT
Start: 2017-01-30 | End: 2017-01-31

## 2017-01-30 RX ORDER — AMOXICILLIN 250 MG
1 CAPSULE ORAL
Status: DISCONTINUED | OUTPATIENT
Start: 2017-01-30 | End: 2017-01-31

## 2017-01-30 RX ORDER — ATORVASTATIN CALCIUM 40 MG/1
20 TABLET, FILM COATED ORAL DAILY
Status: DISCONTINUED | OUTPATIENT
Start: 2017-01-31 | End: 2017-01-31

## 2017-01-30 RX ORDER — ACETAMINOPHEN 325 MG/1
650 TABLET ORAL EVERY 6 HOURS PRN
Status: DISCONTINUED | OUTPATIENT
Start: 2017-01-30 | End: 2017-01-31

## 2017-01-30 RX ORDER — AMOXICILLIN AND CLAVULANATE POTASSIUM 500; 125 MG/1; MG/1
1 TABLET, FILM COATED ORAL 2 TIMES DAILY
Status: ON HOLD | COMMUNITY
End: 2017-02-01

## 2017-01-30 RX ORDER — AMOXICILLIN AND CLAVULANATE POTASSIUM 500; 125 MG/1; MG/1
1 TABLET, FILM COATED ORAL 2 TIMES DAILY
Status: DISCONTINUED | OUTPATIENT
Start: 2017-01-30 | End: 2017-01-30

## 2017-01-30 RX ADMIN — ALBUTEROL SULFATE 2.5 MG: 2.5 SOLUTION RESPIRATORY (INHALATION) at 19:57

## 2017-01-30 RX ADMIN — IPRATROPIUM BROMIDE 0.5 MG: 0.5 SOLUTION RESPIRATORY (INHALATION) at 19:57

## 2017-01-30 RX ADMIN — SODIUM CHLORIDE 1000 ML: 9 INJECTION, SOLUTION INTRAVENOUS at 20:13

## 2017-01-30 RX ADMIN — IPRATROPIUM BROMIDE AND ALBUTEROL SULFATE 3 ML: .5; 3 SOLUTION RESPIRATORY (INHALATION) at 23:48

## 2017-01-30 ASSESSMENT — LIFESTYLE VARIABLES: EVER_SMOKED: UNABLE TO EVALUATE AT THIS TIME - NEEDS ASSESSMENT PRIOR TO DISCHARGE

## 2017-01-30 NOTE — IP AVS SNAPSHOT
" After Visit Summary                                                                                                                  Name:Jodee Basilio  Medical Record Number:[id number]  CSN: [id number]    YOB: 1950   Age: 66 y.o.  Sex: female  HT:1.397 m (4' 7\") WT: 59.4 kg (130 lb 15.3 oz)          Admit Date: 1/30/2017     Discharge Date:   Today's Date: 2/1/2017  Attending Doctor:  Mery Allred M.D.                  Allergies:  Review of patient's allergies indicates no known allergies.            Discharge Instructions       Discharge Instructions    Discharged to home by ambulance with relative via [facility]  Be sure to schedule a follow-up appointment with your primary care doctor or any specialists as instructed.     Discharge Plan:   Diet Plan: Discussed  Activity Level: Discussed  Confirmed Follow up Appointment: No (Comments)  Confirmed Symptoms Management: Discussed  Medication Reconciliation Updated: Yes    I understand that a diet low in cholesterol, fat, and sodium is recommended for good health. Unless I have been given specific instructions below for another diet, I accept this instruction as my diet prescription.   Other diet: regular    Special Instructions: None    · Is patient discharged on Warfarin / Coumadin?   No     · Is patient Post Blood Transfusion?  No    Hospice  Hospice is a service that is designed to provide people who are terminally ill and their families with medical, spiritual, and psychological support. Its aim is to improve your quality of life by keeping you as alert and comfortable as possible. Hospice is performed by a team of health care professionals and volunteers who:  · Help keep you comfortable. Hospice can be provided in your home or in a homelike setting. The hospice staff works with your family and friends to help meet your needs. You will enjoy the support of loved ones by receiving much of your basic care from family and friends.  · Provide pain " relief and manage your symptoms. The staff supply all necessary medicines and equipment.  · Provide companionship when you are alone.  · Allow you and your family to rest. They may do light housekeeping, prepare meals, and run errands.  · Provide counseling. They will make sure your emotional, spiritual, and social needs and those of your family are being met.  · Provide spiritual care. Spiritual care is individualized to meet your needs and your family's needs. It may involve helping you look at what death means to you, say goodbye, or perform a specific Rastafarian ceremony or ritual.  Hospice teams often include:  · A nurse.  · A doctor.  · Social workers.  · Anabaptist leaders (such as a ).  · Trained volunteers.  WHEN SHOULD HOSPICE CARE BEGIN?  Most people who use hospice are believed to have fewer than 6 months to live. Your family and health care providers can help you decide when hospice services should begin. If your condition improves, you may discontinue the program.  WHAT SHOULD I CONSIDER BEFORE SELECTING A PROGRAM?  Most hospice programs are run by nonprofit, independent organizations. Some are affiliated with hospitals, nursing homes, or home health care agencies. Hospice programs can take place in the home or at a hospice center, hospital, or skilled nursing facility. When choosing a hospice program, ask the following questions:  · What services are available to me?  · What services are offered to my loved ones?  · How involved are my loved ones?  · How involved is my health care provider?  · Who makes up the hospice care team? How are they trained or screened?  · How will my pain and symptoms be managed?  · If my circumstances change, can the services be provided in a different setting, such as my home or in the hospital?  · Is the program reviewed and licensed by the state or certified in some other way?  WHERE CAN I LEARN MORE ABOUT HOSPICE?  You can learn about existing hospice programs in  your area from your health care providers. You can also read more about hospice online. The websites of the following organizations contain helpful information:  · The National Hospice and Palliative Care Organization (NHPCO).  · The Hospice Association of [place] (HAA).  · The Hospice Education [place].  · The American Cancer Society (ACS).  · Hospice Net.     This information is not intended to replace advice given to you by your health care provider. Make sure you discuss any questions you have with your health care provider.     Document Released: 04/05/2005 Document Revised: 12/23/2014 Document Reviewed: 10/28/2014  [employer] Interactive Patient Education ©2016 [employer]      Depression / Suicide Risk    As you are discharged from this [facility] facility, it is important to learn how to keep safe from harming yourself.    Recognize the warning signs:  · Abrupt changes in personality, positive or negative- including increase in energy   · Giving away possessions  · Change in eating patterns- significant weight changes-  positive or negative  · Change in sleeping patterns- unable to sleep or sleeping all the time   · Unwillingness or inability to communicate  · Depression  · Unusual sadness, discouragement and loneliness  · Talk of wanting to die  · Neglect of personal appearance   · Rebelliousness- reckless behavior  · Withdrawal from people/activities they love  · Confusion- inability to concentrate     If you or a loved one observes any of these behaviors or has concerns about self-harm, here's what you can do:  · Talk about it- your feelings and reasons for harming yourself  · Remove any means that you might use to hurt yourself (examples: pills, rope, extension cords, firearm)  · Get professional help from the community (Mental Health, Substance Abuse, psychological counseling)  · Do not be alone:Call your Safe Contact- someone whom you trust who will be there for you.  · Call your local CRISIS  HOTLINE 309-2968 or 113-239-2012  · Call your local Children's Mobile Crisis Response Team [place] (065) 543-6741 or www.[employer]  · Call the toll free National Suicide Prevention Hotlines   · National Suicide Prevention Lifeline 815-239-YWAG (5938)  · National Hope Line Network 800-SUICIDE (110-5945)        Your appointments     Feb 02, 2017  3:00 PM   Established Patient with Crystal Cantor M.D.   [facility] [address]    [address]  [facility] [address]  [facility] [address] [phone]           You will be receiving a confirmation call a few days before your appointment from our automated call confirmation system.            Mar 09, 2017  3:00 PM   Established Patient with Crystal Cantor M.D.   [facility] [address]    [address]  [facility] [address]  [facility] [address] [phone]           You will be receiving a confirmation call a few days before your appointment from our automated call confirmation system.              Follow-up Information     1. Follow up with [facility] ([facility]) .    Specialty:  Hospice    Contact information    [address] [place] [address]  [address]  [facility] [id number] [phone]         Discharge Medication Instructions:    Below are the medications your physician expects you to take upon discharge:    Review all your home medications and newly ordered medications with your doctor and/or pharmacist. Follow medication instructions as directed by your doctor and/or pharmacist.    Please keep your medication list with you and share with your physician.               Medication List      STOP taking these medications     amoxicillin-clavulanate 500-125 MG Tabs   Commonly known as:  AUGMENTIN       ascorbic acid 500 MG Tabs   Commonly known as:  ascorbic acid       aspirin EC 81 MG Tbec   Commonly known as:  ECOTRIN       atorvastatin 20 MG Tabs   Commonly known as:  LIPITOR       benzonatate 100 MG Caps   Commonly  known as:  TESSALON       budesonide-formoterol 80-4.5 MCG/ACT Aero   Commonly known as:  SYMBICORT       guaifenesin-codeine Soln oral solution   Commonly known as:  ROBITUSSIN AC       olanzapine 2.5 MG Tabs   Commonly known as:  ZYPREXA       omeprazole 20 MG delayed-release capsule   Commonly known as:  PRILOSEC       promethazine-codeine 6.25-10 MG/5ML Syrp   Commonly known as:  PHENERGAN-CODEINE               Instructions           Diet / Nutrition:    Follow any diet instructions given to you by your doctor or the dietician, including how much salt (sodium) you are allowed each day.    If you are overweight, talk to your doctor about a weight reduction plan.    Activity:    Remain physically active following your doctor's instructions about exercise and activity.    Rest often.     Any time you become even a little tired or short of breath, SIT DOWN and rest.    Worsening Symptoms:    Report any of the following signs and symptoms to the doctor's office immediately:    *Pain of jaw, arm, or neck  *Chest pain not relieved by medication                               *Dizziness or loss of consciousness  *Difficulty breathing even when at rest   *More tired than usual                                       *Bleeding drainage or swelling of surgical site  *Swelling of feet, ankles, legs or stomach                 *Fever (>100ºF)  *Pink or blood tinged sputum  *Weight gain (3lbs/day or 5lbs /week)           *Shock from internal defibrillator (if applicable)  *Palpitations or irregular heartbeats                *Cool and/or numb extremities    Stroke Awareness    Common Risk Factors for Stroke include:    Age  Atrial Fibrillation  Carotid Artery Stenosis  Diabetes Mellitus  Excessive alcohol consumption  High blood pressure  Overweight   Physical inactivity  Smoking    Warning signs and symptoms of a stroke include:    *Sudden numbness or weakness of the face, arm or leg (especially on one side of the body).  *Sudden  confusion, trouble speaking or understanding.  *Sudden trouble seeing in one or both eyes.  *Sudden trouble walking, dizziness, loss of balance or coordination.Sudden severe headache with no known cause.    It is very important to get treatment quickly when a stroke occurs. If you experience any of the above warning signs, call 911 immediately.                   Disclaimer         Quit Smoking / Tobacco Use:    I understand the use of any tobacco products increases my chance of suffering from future heart disease or stroke and could cause other illnesses which may shorten my life. Quitting the use of tobacco products is the single most important thing I can do to improve my health. For further information on smoking / tobacco cessation call a Toll Free Quit Line at 1-297.277.2593 (*National Cancer [place]) or 1-150.466.1700 (American Lung Association) or you can access the web based program at www.lungusa.org.    [place] Tobacco Users Help Line:  (557) 493-2598       Toll Free: 1-182.615.8013  Quit Tobacco Program [facility] (332)436-8821    Crisis Hotline:    [place] Crisis Hotline:  3-562-XLYSADM or 1-180.863.4969    [place] Crisis Hotline:    1-670.140.2896 or 452-637-1602    Discharge Survey:   Thank you for choosing [facility]. We hope we did everything we could to make your hospital stay a pleasant one. You may be receiving a phone survey and we would appreciate your time and participation in answering the questions. Your input is very valuable to us in our efforts to improve our service to our patients and their families.        My signature on this form indicates that:    1. I have reviewed and understand the above information.  2. My questions regarding this information have been answered to my satisfaction.  3. I have formulated a plan with my discharge nurse to obtain my prescribed medications for home.                  Disclaimer         __________________________________                      __________       ________                       Patient Signature                                                 Date                    Time

## 2017-01-30 NOTE — IP AVS SNAPSHOT
" <p align=\"LEFT\"><IMG SRC=\"//EMRWB/blob$/[email or web address]\" alt=\"Image\" WIDTH=\"50%\" HEIGHT=\"200\" BORDER=\"\"></p>                   Name:Jodee Basilio  Medical Record Number:[id number]  CSN: [id number]    YOB: 1950   Age: 66 y.o.  Sex: female  HT:1.397 m (4' 7\") WT: 59.4 kg (130 lb 15.3 oz)          Admit Date: 1/30/2017     Discharge Date:   Today's Date: 2/1/2017  Attending Doctor:  Mery Allred M.D.                  Allergies:  Review of patient's allergies indicates no known allergies.          Your appointments     Feb 02, 2017  3:00 PM   Established Patient with Crystal Cantor M.D.   [address]    [address] [place] [id number] [phone]           You will be receiving a confirmation call a few days before your appointment from our automated call confirmation system.            Mar 09, 2017  3:00 PM   Established Patient with Crystal Cantor M.D.   [facility] [address]    [address] [facility] [address]  [facility] [id number] [phone]           You will be receiving a confirmation call a few days before your appointment from our automated call confirmation system.              Follow-up Information     1. Follow up with [facility] ([facility]) .    Specialty:  Hospice    Contact information    [address] [place] [address]  [address]  [facility] [address]  [phone]         Medication List      Notice     You have not been prescribed any medications.      "

## 2017-01-30 NOTE — LETTER
[facility] ([facility]) - [phone]  EHR eReferral Notification Requirements    To be sent by secure email to [email or web address] ([place] or   by fax to [phone]       FIELDS ARE REQUIRED TO BE COMPLETED     Patient Name:  Jodee Basilio  MRN:  [id number]   Account Number: [id number]                YOB: 1950    Date Roomed in ED:    1/30/2017   6:58 PM  Date First Observation Order Placed: 1/30/2017   10:07 PM  Date First Inpatient Order Placed:         Date of Previous Admission (Needed For Readmission Reviews Only):     Discharge Date and Time (if applicable): No discharge date for patient encounter.     PLEASE CHECK OFF TYPE OF REVIEW & CURRENT ADMISSION STATUS FROM LISTS BELOW  Type of Review:  Admission review    Dates to be Reviewed: 01/30/2017-01/31/2017        Current Admission Status:  Observation-Outpatient    / Contact Number for EHR outcome/recommendation call:    Netta Mullins RN  [phone]     Attending Physician/ Contact Number (if not the same as in electronic record):  Dr Allred  [phone]     Comments:  Please review for IP status      Additional Information being Emailed or Faxed:  Yes    Fax Handwritten Supporting Documents to EHR at [phone]      [address] [address]  [phone]  [email or web address] ([place])    Updated December 17, 2014

## 2017-01-30 NOTE — IP AVS SNAPSHOT
2/1/2017          Jodee Valdezkarthik Basilio  [address] [address]    Dear Jodee:    [facility] wants to ensure your discharge home is safe and you or your loved ones have had all your questions answered regarding your care after you leave the hospital.    You may receive a telephone call within two days of your discharge.  This call is to make certain you understand your discharge instructions as well as ensure we provided you with the best care possible during your stay with us.     The call will only last approximately 3-5 minutes and will be done by a nurse.    Once again, we want to ensure your discharge home is safe and that you have a clear understanding of any next steps in your care.  If you have any questions or concerns, please do not hesitate to contact us, we are here for you.  Thank you for choosing [facility] for your healthcare needs.    Sincerely,    Davian Medeiros    [facility]

## 2017-01-30 NOTE — IP AVS SNAPSHOT
Canal do Credito Access Code: Activation code not generated  Current Canal do Credito Status: Active    Kedu[employer]art  A secure, online tool to manage your health information     [employer]’s Canal do Credito® is a secure, online tool that connects you to your personalized health information from the privacy of your home -- day or night - making it very easy for you to manage your healthcare. Once the activation process is completed, you can even access your medical information using the Canal do Credito diane, which is available for free in the Apple Diane store or Google Play store.     Canal do Credito provides the following levels of access (as shown below):   My Chart Features   [facility] Primary Care Doctor [facility]  Specialists [facility]  Urgent  Care Non-[facility]  Primary Care  Doctor   Email your healthcare team securely and privately 24/7 X X X X   Manage appointments: schedule your next appointment; view details of past/upcoming appointments X      Request prescription refills. X      View recent personal medical records, including lab and immunizations X X X X   View health record, including health history, allergies, medications X X X X   Read reports about your outpatient visits, procedures, consult and ER notes X X X X   See your discharge summary, which is a recap of your hospital and/or ER visit that includes your diagnosis, lab results, and care plan. X X       How to register for Canal do Credito:  1. Go to  [email or web address].  2. Click on the Sign Up Now box, which takes you to the New Member Sign Up page. You will need to provide the following information:  a. Enter your Canal do Credito Access Code exactly as it appears at the top of this page. (You will not need to use this code after you’ve completed the sign-up process. If you do not sign up before the expiration date, you must request a new code.)   b. Enter your date of birth.   c. Enter your home email address.   d. Click Submit, and follow the next screen’s instructions.  3. Create a Canal do Credito ID. This will  be your dinCloud login ID and cannot be changed, so think of one that is secure and easy to remember.  4. Create a dinCloud password. You can change your password at any time.  5. Enter your Password Reset Question and Answer. This can be used at a later time if you forget your password.   6. Enter your e-mail address. This allows you to receive e-mail notifications when new information is available in dinCloud.  7. Click Sign Up. You can now view your health information.    For assistance activating your dinCloud account, call [phone]

## 2017-01-31 PROBLEM — J96.01 ACUTE RESPIRATORY FAILURE WITH HYPOXIA (HCC): Status: ACTIVE | Noted: 2017-01-31

## 2017-01-31 LAB
ANION GAP SERPL CALC-SCNC: 7 MMOL/L (ref 0–11.9)
BASOPHILS # BLD AUTO: 0.5 % (ref 0–1.8)
BASOPHILS # BLD: 0.02 K/UL (ref 0–0.12)
BUN SERPL-MCNC: 6 MG/DL (ref 8–22)
CALCIUM SERPL-MCNC: 8 MG/DL (ref 8.4–10.2)
CHLORIDE SERPL-SCNC: 111 MMOL/L (ref 96–112)
CO2 SERPL-SCNC: 24 MMOL/L (ref 20–33)
CREAT SERPL-MCNC: 0.49 MG/DL (ref 0.5–1.4)
EOSINOPHIL # BLD AUTO: 0.05 K/UL (ref 0–0.51)
EOSINOPHIL NFR BLD: 1.3 % (ref 0–6.9)
ERYTHROCYTE [DISTWIDTH] IN BLOOD BY AUTOMATED COUNT: 42.9 FL (ref 35.9–50)
GFR SERPL CREATININE-BSD FRML MDRD: >60 ML/MIN/1.73 M 2
GLUCOSE SERPL-MCNC: 112 MG/DL (ref 65–99)
HCT VFR BLD AUTO: 43.1 % (ref 37–47)
HGB BLD-MCNC: 14.5 G/DL (ref 12–16)
IMM GRANULOCYTES # BLD AUTO: 0.02 K/UL (ref 0–0.11)
IMM GRANULOCYTES NFR BLD AUTO: 0.5 % (ref 0–0.9)
LYMPHOCYTES # BLD AUTO: 0.73 K/UL (ref 1–4.8)
LYMPHOCYTES NFR BLD: 18.3 % (ref 22–41)
MCH RBC QN AUTO: 29.3 PG (ref 27–33)
MCHC RBC AUTO-ENTMCNC: 33.6 G/DL (ref 33.6–35)
MCV RBC AUTO: 87.1 FL (ref 81.4–97.8)
MONOCYTES # BLD AUTO: 0.45 K/UL (ref 0–0.85)
MONOCYTES NFR BLD AUTO: 11.3 % (ref 0–13.4)
NEUTROPHILS # BLD AUTO: 2.73 K/UL (ref 2–7.15)
NEUTROPHILS NFR BLD: 68.1 % (ref 44–72)
NRBC # BLD AUTO: 0 K/UL
NRBC BLD AUTO-RTO: 0 /100 WBC
PLATELET # BLD AUTO: 125 K/UL (ref 164–446)
PMV BLD AUTO: 10.8 FL (ref 9–12.9)
POTASSIUM SERPL-SCNC: 4.1 MMOL/L (ref 3.6–5.5)
RBC # BLD AUTO: 4.95 M/UL (ref 4.2–5.4)
SODIUM SERPL-SCNC: 142 MMOL/L (ref 135–145)
WBC # BLD AUTO: 4 K/UL (ref 4.8–10.8)

## 2017-01-31 PROCEDURE — 700101 HCHG RX REV CODE 250: Performed by: HOSPITALIST

## 2017-01-31 PROCEDURE — 87040 BLOOD CULTURE FOR BACTERIA: CPT

## 2017-01-31 PROCEDURE — A9270 NON-COVERED ITEM OR SERVICE: HCPCS | Performed by: INTERNAL MEDICINE

## 2017-01-31 PROCEDURE — 94760 N-INVAS EAR/PLS OXIMETRY 1: CPT

## 2017-01-31 PROCEDURE — 85025 COMPLETE CBC W/AUTO DIFF WBC: CPT

## 2017-01-31 PROCEDURE — 94640 AIRWAY INHALATION TREATMENT: CPT

## 2017-01-31 PROCEDURE — 700102 HCHG RX REV CODE 250 W/ 637 OVERRIDE(OP): Performed by: HOSPITALIST

## 2017-01-31 PROCEDURE — 80048 BASIC METABOLIC PNL TOTAL CA: CPT

## 2017-01-31 PROCEDURE — 700111 HCHG RX REV CODE 636 W/ 250 OVERRIDE (IP): Performed by: INTERNAL MEDICINE

## 2017-01-31 PROCEDURE — 700111 HCHG RX REV CODE 636 W/ 250 OVERRIDE (IP)

## 2017-01-31 PROCEDURE — 700105 HCHG RX REV CODE 258

## 2017-01-31 PROCEDURE — A9270 NON-COVERED ITEM OR SERVICE: HCPCS | Performed by: HOSPITALIST

## 2017-01-31 PROCEDURE — 99233 SBSQ HOSP IP/OBS HIGH 50: CPT | Performed by: INTERNAL MEDICINE

## 2017-01-31 PROCEDURE — 700102 HCHG RX REV CODE 250 W/ 637 OVERRIDE(OP): Performed by: INTERNAL MEDICINE

## 2017-01-31 PROCEDURE — 770001 HCHG ROOM/CARE - MED/SURG/GYN PRIV*

## 2017-01-31 RX ORDER — BUDESONIDE 0.5 MG/2ML
0.5 INHALANT ORAL
Status: DISCONTINUED | OUTPATIENT
Start: 2017-01-31 | End: 2017-01-31

## 2017-01-31 RX ORDER — MORPHINE SULFATE 10 MG/ML
5-10 INJECTION, SOLUTION INTRAMUSCULAR; INTRAVENOUS
Status: DISCONTINUED | OUTPATIENT
Start: 2017-01-31 | End: 2017-02-01 | Stop reason: HOSPADM

## 2017-01-31 RX ORDER — PROMETHAZINE HYDROCHLORIDE AND CODEINE PHOSPHATE 6.25; 1 MG/5ML; MG/5ML
5 SYRUP ORAL 4 TIMES DAILY PRN
Status: ON HOLD | COMMUNITY
End: 2017-02-01

## 2017-01-31 RX ORDER — ASCORBIC ACID 500 MG
1000 TABLET ORAL DAILY
Status: ON HOLD | COMMUNITY
End: 2017-02-01

## 2017-01-31 RX ORDER — LORAZEPAM 2 MG/ML
1-2 INJECTION INTRAMUSCULAR EVERY 4 HOURS PRN
Status: DISCONTINUED | OUTPATIENT
Start: 2017-01-31 | End: 2017-02-01 | Stop reason: HOSPADM

## 2017-01-31 RX ORDER — GLYCOPYRROLATE 0.2 MG/ML
0.2 INJECTION INTRAMUSCULAR; INTRAVENOUS EVERY 4 HOURS PRN
Status: DISCONTINUED | OUTPATIENT
Start: 2017-01-31 | End: 2017-02-01 | Stop reason: HOSPADM

## 2017-01-31 RX ORDER — BENZONATATE 100 MG/1
100 CAPSULE ORAL 3 TIMES DAILY PRN
Status: ON HOLD | COMMUNITY
End: 2017-02-01

## 2017-01-31 RX ORDER — LORAZEPAM 2 MG/ML
3-4 INJECTION INTRAMUSCULAR EVERY 4 HOURS PRN
Status: DISCONTINUED | OUTPATIENT
Start: 2017-01-31 | End: 2017-02-01 | Stop reason: HOSPADM

## 2017-01-31 RX ORDER — ACETAMINOPHEN 650 MG/1
325 SUPPOSITORY RECTAL EVERY 6 HOURS PRN
Status: DISCONTINUED | OUTPATIENT
Start: 2017-01-31 | End: 2017-02-01 | Stop reason: HOSPADM

## 2017-01-31 RX ORDER — OLANZAPINE 2.5 MG/1
2.5 TABLET, FILM COATED ORAL NIGHTLY
Status: ON HOLD | COMMUNITY
End: 2017-02-01

## 2017-01-31 RX ORDER — LORAZEPAM 2 MG/ML
5 INJECTION INTRAMUSCULAR EVERY 4 HOURS PRN
Status: DISCONTINUED | OUTPATIENT
Start: 2017-01-31 | End: 2017-02-01 | Stop reason: HOSPADM

## 2017-01-31 RX ADMIN — ACETAMINOPHEN 325 MG: 650 SUPPOSITORY RECTAL at 17:43

## 2017-01-31 RX ADMIN — POTASSIUM CHLORIDE AND SODIUM CHLORIDE: 900; 150 INJECTION, SOLUTION INTRAVENOUS at 00:06

## 2017-01-31 RX ADMIN — MORPHINE SULFATE 5 MG: 10 INJECTION INTRAVENOUS at 17:40

## 2017-01-31 RX ADMIN — BUDESONIDE 0.5 MG: 0.5 INHALANT RESPIRATORY (INHALATION) at 07:16

## 2017-01-31 RX ADMIN — IPRATROPIUM BROMIDE AND ALBUTEROL SULFATE 3 ML: .5; 3 SOLUTION RESPIRATORY (INHALATION) at 07:08

## 2017-01-31 RX ADMIN — SODIUM CHLORIDE: 900 INJECTION INTRAVENOUS at 00:07

## 2017-01-31 NOTE — CARE PLAN
Problem: Respiratory:  Goal: Respiratory status will improve  Continuous pulse ox in place. O2 titrated to pt need. RT involved to give breathing treatments PRN. Pt respiratory rate remains even and unlabored.     Problem: Skin Integrity  Goal: Risk for impaired skin integrity will decrease  Skin assessed. Acevedo in place PTA, protecting skin from incontinent dermatitis. Pt turned Q2h. Barrier paste applied as needed.

## 2017-01-31 NOTE — FLOWSHEET NOTE
01/30/17 2000   SVN Group   #SVN Performed Yes   Given By: Mask   Date SVN Last Changed 01/30/17   Date SVN Next Change Due (Q 7 Days) 02/06/17   Chest Exam   Respiration 20   Pulse (!) 116   Breath Sounds   RUL Breath Sounds Expiratory Wheezes   RML Breath Sounds Expiratory Wheezes   RLL Breath Sounds Diminished;Expiratory Wheezes   SETH Breath Sounds Expiratory Wheezes   LLL Breath Sounds Diminished;Expiratory Wheezes   Oxygen   Pulse Oximetry 99 %

## 2017-01-31 NOTE — PROGRESS NOTES
Palliative Care consulted w/family this a.m. Pt placed on comfort care. POA is Tomasito Basilio (Laie). Sister at bedside currently. Pt repositioned for comfort. Resting quietly with eyes closed; respirations calm, even, and unlabored.

## 2017-01-31 NOTE — ED PROVIDER NOTES
ED Provider Note    CHIEF COMPLAINT  Chief Complaint   Patient presents with   • Shortness of Breath   • Wheezing       HPI  Jodee Basilio is a 66 y.o. female who presents to the ER complain of shortness of breath.  History is obtained from the family because patient is unable to provide history.  She presents here with increasing shortness of breath over the last couple of days.  Family noticed wheezing.  She also had a cough and cold foods.  No fevers have been noted.  Patient is nonverbal at baseline.  No other history is obtainable.      REVIEW OF SYSTEMS  See HPI for further details.  Unable to obtain history because of mental status.    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   • Dental disorder      upper and lower dentures   • Snoring      no sleep study   • Hyperlipidemia    • Developmental delay    • GERD (gastroesophageal reflux disease)        FAMILY HISTORY  Family History   Problem Relation Age of Onset   • Cancer Mother      Lung   • Diabetes Father        SOCIAL HISTORY  Social History     Social History   • Marital Status: Single     Spouse Name: N/A   • Number of Children: N/A   • Years of Education: N/A     Social History Main Topics   • Smoking status: Never Smoker    • Smokeless tobacco: Never Used   • Alcohol Use: No   • Drug Use: No   • Sexual Activity: Not Currently     Other Topics Concern   • None     Social History Narrative       SURGICAL HISTORY  Past Surgical History   Procedure Laterality Date   • Other orthopedic surgery   R hip replacement   • Toe fusion  6/11/2014     Performed by Zechariah Keys M.D. at SURGERY [facility]   • Hammertoe correction  6/11/2014     Performed by Zechariah Keys M.D. at SURGERY [facility]       CURRENT MEDICATIONS  Home Medications     Reviewed by Mercedes Adam R.N. (Registered Nurse) on 01/30/17 at 1926  Med List Status: Partial    Medication Last Dose Status  "   aspirin EC (ECOTRIN) 81 MG Tablet Delayed Response 1/30/2017 Active    atorvastatin (LIPITOR) 20 MG Tab 1/30/2017 Active    budesonide-formoterol (SYMBICORT) 80-4.5 MCG/ACT Aerosol 1/30/2017 Active                ALLERGIES  No Known Allergies    PHYSICAL EXAM  VITAL SIGNS: /92 mmHg  Pulse 116  Temp(Src) 37.2 °C (98.9 °F)  Resp 20  Ht 1.397 m (4' 7\")  Wt 59.875 kg (132 lb)  BMI 30.68 kg/m2  SpO2 98%     Constitutional: Awake, alert, ill appearing, no acute distress  HENT: Normocephalic, Atraumatic, Bilateral external ears normal, Oropharynx moist, No oral exudates, Nose normal.   Eyes: PERRL, EOMI, Conjunctiva normal, No discharge.   Neck: Normal range of motion, No tenderness, Supple, No stridor.   Cardiovascular: Normal heart rate, Normal rhythm, No murmurs, No rubs, No gallops.   Thorax & Lungs: Wheezing, coarse breath sounds bilaterally.  Abdomen: Bowel sounds normal, Soft, No tenderness  Skin: Warm, Dry, No erythema  Musculoskeletal: Good range of motion in all major joints. No  Significant edema  Neurologic: Nonverbal, some flexion contractures of extremities.  Psychiatric: Affect.  Unable to assess        RADIOLOGY/PROCEDURES  DX-CHEST-PORTABLE (1 VIEW)   Final Result      No acute cardiopulmonary abnormality identified.          COURSE & MEDICAL DECISION MAKING  Pertinent Labs & Imaging studies reviewed. (See chart for details)  Age and presents for new onset acute dyspnea.  She is brought in EMS.  Sutures are to be hypoxemic in the field.  She is given a breathing treatment with some improvement.  She remains hypoxemic here and still having some wheezing and signs of increased work of breathing.  She is given a breathing treatment, chest x-ray and labs are obtained.  Really nothing has been identified.  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  She is currently on hospice.  The family's request she be admitted for managing her wheezing and difficulty " breathing.    Because of her persistent difficulty breathing and tachycardia.  She'll be admitted.  She may well have a PE, but she is not a candidate for history of anticoagulation should this not pursued.     FINAL IMPRESSION  1. Bronchospasm    2. Dyspnea, unspecified type    3. Hypoxia        2.   3.         Electronically signed by: Justice Raya, 1/30/2017 7:36 PM

## 2017-01-31 NOTE — CONSULTS
"Reason for PC Consult: Advance Care Planning    Consulted by: Margaret    Assessment:  General: 66 year old female admitted last night for cough and wheezing. PMH includes developmental delay, dementia, myoclonic jerks, strokes, and dysphagia. Was on service with [facility]. Patient's sister and 24 hour care giver Wai Basilio called hospice for change in LOC, not eating, and respiratory distress. The decision was made to bring patient to the hospital.     Dyspnea: No- 97% 2 LPM via nasal cannula. unlabored respiratory pattern. Patient with gurgling cough; mostly clearing secretions.  Last BM:  PTA.    Pain: Assume pain present; non-verbal with slightly furrowed brow.  Depression: Unable to determine.       Spiritual:  Is Sabianism or spirituality important for coping with this illness? Yes - patient Muslim; patient's sister declined spiritual visit at this time and states patient has had a  in the home.   Has a  or spiritual provider visit been requested? No    Palliative Performance Scale: 10%    Advance Directive: Advance Directive on file. DPOA-HC and directives; located in Chart Review > Media Tab > Advance Directive.  DPOA: Yes- Isa Basilio [phone]  POLST: None      Code Status: DNR; DPOA-HC and caregiver agree for comfort measures only.     Outcome:  Met with patient's sister Wai and patient's brother DPOA-HC Isa Basilio [phone] via FaceTime video conferencing. Isa states that there is a meeting today at 1400 with himself, [facility], and [facility] disability support. Isa stated, \"They should have called me before taking her to the hospital.\" Isa lives in [place] and Wai stated the RN recommended the hospital. Isa and Wai agree that comfort care is what the patient would want and is what is in her best interest. Provided office/cell number to [employer] and requested he call after 1400 meeting with " "outcome. Ron stated, \"This is just so hard for me.\" She was very tearful. Briefly discussed other options such as hospitalized hospice, hospice at SNF or . Ron stated, \"No, I can do it. I know she want's to be home.\" Active listening, statements of support, and therapeutic touch provided. Dr. Allred rounded and discussed above with family.     Updated: RN and SW.    Plan: Initiate comfort care. F/u with Isa after 1400 meeting with [facility] and [facility] for post acute plan.     Thank you for allowing Palliative Care to participate in this patient's care. Please feel free to call [phone] with any questions or concerns.  "

## 2017-01-31 NOTE — PROGRESS NOTES
Assessment completed--see doc flowsheet. No family present at this time. Pt non-verbal and does not answer questions. NPO at this time as swallow eval unable to be performed as pt unable to participate. Acevedo in place from admit, draining to gravity, clear, yellow urine. Repositioned for comfort w/pillows for support. POC discussed, verbalized understanding. Call light and personal possessions within reach, bed in low position, encouraged to call for assistance.

## 2017-01-31 NOTE — ED NOTES
Pt unable to answer orientation questions due to nonverbal status. Per family pt has hx of dementia. Requires 2x assistance to reposition. Chest rising evenly and unlabored. No signs of distress or discomfort.

## 2017-01-31 NOTE — ED NOTES
"Chief Complaint   Patient presents with   • Shortness of Breath   • Wheezing     /92 mmHg  Pulse 116  Temp(Src) 37.2 °C (98.9 °F)  Resp 20  Ht 1.397 m (4' 7\")  Wt 59.875 kg (132 lb)  BMI 30.68 kg/m2  SpO2 98%    "

## 2017-01-31 NOTE — FLOWSHEET NOTE
01/30/17 2348   Events/Summary/Plan   Events/Summary/Plan SVN    Non-Invasive Resp Device Site Inspection Completed Intact   Interdisciplinary Plan of Care-Goals (Indications)   Obstructive Ventilatory Defect or Pulmonary Disease without Obvious Obstruction Physical Exam / Hyperinflation / Wheezing (bronchospasm)   Interdisciplinary Plan of Care-Outcomes    Bronchodilator Outcome Improvement in Airflow (peak flow, PFT);Diminished Wheezing and Volume of Air Movement Increased   Education   Education Yes - Pt. / Family has been Instructed in use of Respiratory Equipment;Yes - Pt. / Family has been Instructed in use of Respiratory Medications and Adverse Reactions   RT Assessment of Delivered Medications   Evaluation of Medication Delivery Daily Yes-- Pt /Family has been Instructed in use of Respiratory Medications and Adverse Reactions   SVN Group   #SVN Performed Yes   Given By: Mask   Date SVN Last Changed 01/30/17   Date SVN Next Change Due (Q 7 Days) 03/01/17   MDI/DPI Group   #MDI/DPI Given (Unable to perform )   Respiratory WDL   Respiratory (WDL) X   Chest Exam   Work Of Breathing / Effort Mild   Respiration 16   Pulse 91   Breath Sounds   Pre/Post Intervention Post Intervention Assessment   Secretions   Cough (none noted at this time )   Oximetry   #Pulse Oximetry (Single Determination) Yes   Oxygen   Home O2 Use Prior To Admission? No   Pulse Oximetry 96 %   O2 (LPM) 2   O2 Daily Delivery Respiratory  Silicone Nasal Cannula

## 2017-01-31 NOTE — PROGRESS NOTES
2250: Pt arrived to the floor in stable condition. Transferred to the bed via slide board. Pt non verbal but does make eye contact. Unable to follow commands such as closing her mouth to take an oral temp. Swallow eval not attempted d/t this. Pt assessed, cleaned up and repositioned for comfort. Dr juarez paged to discuss swallow eval. Admit profile not complete d/t pt non verbal status and no family present.   2320: Spoke to Dr. Juarez. Informed that RN did not feel safe performing swallow eval. MD to change route of medications.  2340: RT at the bedside to assess and give breathing treatment.   0010: Pt medicated with IV medications. Pt sleeping with no s/s of distress. Respirations even and unlabored. Pt snoring.   0130: Pt repositioned. Back to sleep.   0400: Pt repositioned. Rupal care performed. Mouth care performed. Warm blankets provided.   0550: Antibiotic hung. Pt sleeping with no s/s of distress.   0655: Report given to Jeannie MACEDO

## 2017-01-31 NOTE — PROGRESS NOTES
"PALLIATIVE CARE:  Advance Directive found in Media tab. DPOA-HC is patient's brother Isa Basilio [phone]. Patient also has directives that state, \"I do not want life-longing care, treatment, services and procedures unless there is a real likely lua that I will be restored to the capacity of a fully functioning human being,\" and \"I do want care, treatment, services, and procedures to alleviate pain.\" Discussed with RN who reports no family at bedside. Stated I would re-scan document so AD/POLST tab activates green. Attempted to do however computer attached to scanner currently out of order. Called back and spoke with nursing coordinator Yenny Cates; appreciate her printing AD from Media Tab and providing to bedside RN.     Previously on service with [facility]. Call placed to [facility]. The report that patient has been discharged off of service as since she was brought to the hospital. No POLST was completed.    Message left patient's brother and DPOA-HC Isa Basilio at [phone] with introduction of self/palliative care and requested return call. Call placed to patient's sister Ron who reports she is now at the hospital and available until early afternoon. Will meet with Ron at 10:00.     Outcome: Meet with patient/caregiver at 10:00.    Thank you for allowing Palliative Care to follow this patient. Please contact us at  with any questions.   "

## 2017-01-31 NOTE — H&P
PRIMARY CARE PHYSICIAN:  Dr. Cantor.    CHIEF COMPLAINT:  Cough and wheezing.    HISTORY OF PRESENT ILLNESS:  A 66-year-old female.  She has a history of   developmental delay as well as dementia which has been quite progressive over   the past 2 years per her sister.  She is essentially 24-hour care by her   sister.  The patient is nonverbal at baseline and is unable to give any   interval history per her sister.  She has been coughing and they have noticed   some wheezing over the past couple of days.  The patient was seen by primary   care physician on 01/16/2017, urinary tract infection was ruled out.  It   appears that she was started on an antibiotic.  Sister is at bedside and I do   not have a complete understanding of course of events.  She says that the   patient is recently transitioned to hospice.  She has been seen by [facility] hospice, who has been visiting the patient.  My understanding of the   hospice is that the patient for the past couple of days sister continued to be   concerned about the patient's respiratory status, so she was brought to the   emergency room.    REVIEW OF SYSTEMS:  Unobtainable as patient is nonverbal.    PAST MEDICAL HISTORY:  1.  Developmental delay.  2.  History of myoclonic jerks.  3.  History of strokes.  4.  Presumed vascular dementia.  5.  Dysphagia.    SOCIAL HISTORY:  Patient lives with her sister.  She does not smoke cigarettes   or drink alcohol.    FAMILY HISTORY:  Patient's sister is healthy.    ALLERGIES:  Unknown family history otherwise.    MEDICATIONS:  Augmentin 500/125 one tablet twice daily, aspirin 81 mg daily,   atorvastatin 20 mg daily, Symbicort 80/4.5 two puffs twice daily, Robitussin   AC 5 mL every 4 hours as needed for cough, omeprazole 20 mg daily.    PHYSICAL EXAMINATION:  VITAL SIGNS:  Temperature 37.2, blood pressure 140/92; pulse 112, respirations   20, saturating 98% on 3 liters by nasal cannula.  GENERAL:  The patient is smaller in  stature.  She is lying in bed, opens her   eyes, in no apparent distress.  HEENT:  Pupils are equally round and reactive.  Extraocular movements are   intact.  Anicteric sclerae.  NECK:  Neck is supple, no lymphadenopathy, no thyromegaly.  CARDIOVASCULAR:  Tachycardic.  No murmurs, rubs or gallops.  RESPIRATORY:  Clear to auscultation bilaterally.  I do not auscultate any   wheezing at this point, no respiratory distress.  ABDOMINAL:  Soft, slightly distended, nontender.  EXTREMITIES:  No clubbing, no cyanosis, no edema.  NEUROLOGIC:  Upper extremities are contracted.  She does move bilateral upper   and lower extremities with purpose, but will not participating in neuro exam.    LABORATORY DATA:  White blood cell count 4.8, hemoglobin 14.6; platelets 145.    Sodium 142, potassium 3.2, BUN 8, creatinine 0.69.  Troponin negative.    Lactic acid is 2.28.    IMAGING:  Chest x-ray, no acute cardiopulmonary abnormality identified.    ASSESSMENT AND PLAN:  A 66-year-old female with a history of developmental   delay and progressive dementia, presents with bronchospasm and hypokalemia.  1.  Bronchospasm.  The patient is not wheezing after being started on oxygen   and treated here.  She has been started on Augmentin as an outpatient.  We   will provide p.r.n. nebulized treatments as needed.  2.  Hypokalemia, replete with IV fluids.  3.  Elevated lactic acid.  We will follow, doubt overwhelming sepsis in this   patient.  4.  Prophylaxis:  Lovenox, bowel regimen.  6.  Do not resuscitate.    Perhaps the most important thing would be to establish with some firm goals of   care, possibly a POLST.  I am not sure what has been discussed with the   patient's sister previously, but she is not expressing goals necessarily   consistent with hospice at this point, the patient has recently been   transitioned to hospice and perhaps sister is just having trouble adjusting.    Palliative care consult has been requested.        ____________________________________     MD RODRÍGUEZ MASON    DD:  01/30/2017 22:18:29  DT:  01/31/2017 00:34:36    D#:  [id number]  Job#:  [id number]

## 2017-01-31 NOTE — ED NOTES
Pt BIB via [facility] for SOB and wheezing. Pt on Hospice, but hospice nurse called [facility] because the patient was having difficulty breathing. Pt given 1 albuterol treatment by EMS. Pt does not have a POLST form. Pt non-verbal. Family at BS. Pt currently on 3L O2, afebrile, tachycardic. No s/s resp distress at this time.

## 2017-01-31 NOTE — FLOWSHEET NOTE
01/31/17 0710   Interdisciplinary Plan of Care-Goals (Indications)   Obstructive Ventilatory Defect or Pulmonary Disease without Obvious Obstruction History / Diagnosis   Interdisciplinary Plan of Care-Outcomes    Bronchodilator Outcome Patient at Stable Baseline   Education   Education Yes - Pt. / Family has been Instructed in use of Respiratory Medications and Adverse Reactions;Yes - Pt. / Family has been Instructed in use of Respiratory Equipment   Therapy Not Performed   Type of Therapy Not Performed MDI   Reason Therapy Not Performed (unable to perform)   RT Assessment of Delivered Medications   Evaluation of Medication Delivery Daily Yes-- Pt /Family has been Instructed in use of Respiratory Medications and Adverse Reactions   SVN Group   #SVN Performed Yes   Given By: Mask   MDI/DPI Group   #MDI/DPI Given (unable to perform)   Respiratory WDL   Respiratory (WDL) X   Chest Exam   Work Of Breathing / Effort Mild   Respiration (!) 22   Heart Rate (Monitored) 76   Breath Sounds   Pre/Post Intervention Pre Intervention Assessment   RUL Breath Sounds Diminished;Coarse Crackles   RML Breath Sounds Diminished;Coarse Crackles   RLL Breath Sounds Diminished;Coarse Crackles   SETH Breath Sounds Coarse Crackles   LLL Breath Sounds Coarse Crackles;Diminished   Secretions   Cough Congested;Non Productive;Strong   How Sputum Obtained Spontaneous   Oximetry   #Pulse Oximetry (Single Determination) Yes   Oxygen   Home O2 Use Prior To Admission? No   Pulse Oximetry 97 %   O2 (LPM) 2   O2 Daily Delivery Respiratory  Silicone Nasal Cannula

## 2017-01-31 NOTE — PROGRESS NOTES
Hospital Medicine Progress Note, Adult, Complex               Author: Mery Allred Date & Time created: 1/31/2017  10:15 AM     Interval History:  The patient has a history of developmental delay and progressive demential with functional decline. She was on hospice at home under the care of her sister but was brought to the hospital due to difficulty breathing.    Today she is nonverbal which is her baseline, breathing is improved on 2 liters oxygen and with respiratory therapy.  She has her mouth open most of the time and is unable to follow cues for swallowing.    Review of Systems:  Review of Systems   Unable to perform ROS: dementia       Physical Exam:  Physical Exam   Constitutional: No distress.   HENT:   Mouth/Throat: Oropharynx is clear and moist.   Eyes: Conjunctivae are normal.   Cardiovascular: Normal rate and regular rhythm.    Pulmonary/Chest: Effort normal and breath sounds normal. She has no wheezes. She has no rales.   Abdominal: Bowel sounds are normal. She exhibits no distension. There is no tenderness.   Musculoskeletal: She exhibits no edema.   Skin: Skin is warm. No rash noted.   Nursing note and vitals reviewed.      Labs:  Recent Labs      01/30/17 2000   ISTATSPEC  Venous     Recent Labs      01/30/17 2000   TROPONINI  <0.02   BNPBTYPENAT  15     Recent Labs      01/30/17 2000 01/31/17 0415   SODIUM  140  142   POTASSIUM  3.2*  4.1   CHLORIDE  106  111   CO2  26  24   BUN  8  6*   CREATININE  0.69  0.49*   CALCIUM  8.2*  8.0*     Recent Labs      01/30/17 2000 01/31/17   0415   ALTSGPT  23   --    ASTSGOT  29   --    ALKPHOSPHAT  70   --    TBILIRUBIN  0.5   --    GLUCOSE  164*  112*     Recent Labs      01/30/17 2000 01/31/17   0415   RBC  4.99  4.95   HEMOGLOBIN  14.6  14.5   HEMATOCRIT  43.6  43.1   PLATELETCT  145*  125*   PROTHROMBTM  16.5*   --    APTT  39.4*   --    INR  1.35*   --      Recent Labs      01/30/17 2000 01/31/17 0415   WBC  4.8  4.0*   NEUTSPOLYS   72.30*  68.10   LYMPHOCYTES  15.30*  18.30*   MONOCYTES  9.50  11.30   EOSINOPHILS  2.50  1.30   BASOPHILS  0.20  0.50   ASTSGOT  29   --    ALTSGPT  23   --    ALKPHOSPHAT  70   --    TBILIRUBIN  0.5   --            Hemodynamics:  Temp (24hrs), Av.2 °C (99 °F), Min:37.2 °C (98.9 °F), Max:37.3 °C (99.1 °F)  Temperature: 37.2 °C (99 °F)  Pulse  Av.5  Min: 86  Max: 117Heart Rate (Monitored): 76  Blood Pressure : 126/83 mmHg, NIBP: 118/71 mmHg     Respiratory:    Respiration: 19, Pulse Oximetry: 97 %, O2 Daily Delivery Respiratory : Silicone Nasal Cannula     Given By:: Mask, #MDI/DPI Given:  (unable to perform), Work Of Breathing / Effort: Mild  RUL Breath Sounds: Diminished;Coarse Crackles, RML Breath Sounds: Diminished;Coarse Crackles, RLL Breath Sounds: Diminished;Coarse Crackles, SETH Breath Sounds: Coarse Crackles, LLL Breath Sounds: Coarse Crackles;Diminished  Fluids:    Intake/Output Summary (Last 24 hours) at 17 1015  Last data filed at 17 0600   Gross per 24 hour   Intake    950 ml   Output      0 ml   Net    950 ml     Weight: 59.4 kg (130 lb 15.3 oz)  GI/Nutrition:  Orders Placed This Encounter   Procedures   • Diet Order     Standing Status: Standing      Number of Occurrences: 1      Standing Expiration Date:      Order Specific Question:  Diet:     Answer:  Regular [1]     Medical Decision Making, by Problem:  Active Hospital Problems    Diagnosis   • Bronchospasm [J98.01] improving with pulmicort inhaled, and unasyn   • Vascular dementia [F01.50] progressive decline, palliative care consulted, not sure if family is comfortable resuming hospice at home or if comfort care should be started here   • Thrombocytopenia (CMS-HCC) [D69.6] stable, no bleed   • Gastroesophageal reflux disease without esophagitis [K21.9] prilosec   • Developmental delay [R62.50] at her baseline   DNR    I met with the palliative care nurse, sister at the bedside and brother over the phone, after discussion  about the dying process and the patient's grim prognosis with no oral intake for several days and the sister's inability to care for the patient at home even with hospice, I will order for comfort care as requested by family.    After discussion with EHR and upon evaluating the patient she will need greater than 2 midnight stay for her acute respiratory failure with hypoxia.    Labs reviewed and Medications reviewed  Acevedo catheter: No Acevedo          Ulcer prophylaxis: Yes  Antibiotics: Treating active infection/contamination beyond 24 hours perioperative coverage  Assessed for rehab: Patient returned to prior level of function, rehabilitation not indicated at this time

## 2017-02-01 VITALS
WEIGHT: 130.95 LBS | SYSTOLIC BLOOD PRESSURE: 102 MMHG | OXYGEN SATURATION: 93 % | TEMPERATURE: 100 F | HEART RATE: 104 BPM | DIASTOLIC BLOOD PRESSURE: 50 MMHG | RESPIRATION RATE: 20 BRPM | HEIGHT: 55 IN | BODY MASS INDEX: 30.31 KG/M2

## 2017-02-01 PROCEDURE — 700111 HCHG RX REV CODE 636 W/ 250 OVERRIDE (IP): Performed by: INTERNAL MEDICINE

## 2017-02-01 PROCEDURE — A9270 NON-COVERED ITEM OR SERVICE: HCPCS | Performed by: INTERNAL MEDICINE

## 2017-02-01 PROCEDURE — 99239 HOSP IP/OBS DSCHRG MGMT >30: CPT | Performed by: INTERNAL MEDICINE

## 2017-02-01 PROCEDURE — 700102 HCHG RX REV CODE 250 W/ 637 OVERRIDE(OP): Performed by: INTERNAL MEDICINE

## 2017-02-01 RX ADMIN — MORPHINE SULFATE 5 MG: 10 INJECTION INTRAVENOUS at 05:18

## 2017-02-01 RX ADMIN — ACETAMINOPHEN 325 MG: 650 SUPPOSITORY RECTAL at 05:13

## 2017-02-01 NOTE — DISCHARGE INSTRUCTIONS
Discharge Instructions    Discharged to home by ambulance with relative via [facility]  Be sure to schedule a follow-up appointment with your primary care doctor or any specialists as instructed.     Discharge Plan:   Diet Plan: Discussed  Activity Level: Discussed  Confirmed Follow up Appointment: No (Comments)  Confirmed Symptoms Management: Discussed  Medication Reconciliation Updated: Yes    I understand that a diet low in cholesterol, fat, and sodium is recommended for good health. Unless I have been given specific instructions below for another diet, I accept this instruction as my diet prescription.   Other diet: regular    Special Instructions: None    · Is patient discharged on Warfarin / Coumadin?   No     · Is patient Post Blood Transfusion?  No    Hospice  Hospice is a service that is designed to provide people who are terminally ill and their families with medical, spiritual, and psychological support. Its aim is to improve your quality of life by keeping you as alert and comfortable as possible. Hospice is performed by a team of health care professionals and volunteers who:  · Help keep you comfortable. Hospice can be provided in your home or in a homelike setting. The hospice staff works with your family and friends to help meet your needs. You will enjoy the support of loved ones by receiving much of your basic care from family and friends.  · Provide pain relief and manage your symptoms. The staff supply all necessary medicines and equipment.  · Provide companionship when you are alone.  · Allow you and your family to rest. They may do light housekeeping, prepare meals, and run errands.  · Provide counseling. They will make sure your emotional, spiritual, and social needs and those of your family are being met.  · Provide spiritual care. Spiritual care is individualized to meet your needs and your family's needs. It may involve helping you look at what death means to you, say goodbye, or perform a  specific Sabianism ceremony or ritual.  Hospice teams often include:  · A nurse.  · A doctor.  · Social workers.  · Roman [employer] leaders (such as a ).  · Trained volunteers.  WHEN SHOULD HOSPICE CARE BEGIN?  Most people who use hospice are believed to have fewer than 6 months to live. Your family and health care providers can help you decide when hospice services should begin. If your condition improves, you may discontinue the program.  WHAT SHOULD I CONSIDER BEFORE SELECTING A PROGRAM?  Most hospice programs are run by nonprofit, independent organizations. Some are affiliated with hospitals, nursing homes, or home health care agencies. Hospice programs can take place in the home or at a hospice center, hospital, or skilled nursing facility. When choosing a hospice program, ask the following questions:  · What services are available to me?  · What services are offered to my loved ones?  · How involved are my loved ones?  · How involved is my health care provider?  · Who makes up the hospice care team? How are they trained or screened?  · How will my pain and symptoms be managed?  · If my circumstances change, can the services be provided in a different setting, such as my home or in the hospital?  · Is the program reviewed and licensed by the state or certified in some other way?  WHERE CAN I LEARN MORE ABOUT HOSPICE?  You can learn about existing hospice programs in your area from your health care providers. You can also read more about hospice online. The websites of the following organizations contain helpful information:  · The National Hospice and Palliative Care Organization (NHPCO).  · The Hospice Association of [place] (HAA).  · The Hospice Education [employer].  · The American Cancer Society (ACS).  · Hospice Net.     This information is not intended to replace advice given to you by your health care provider. Make sure you discuss any questions you have with your health care provider.     Document  Released: 04/05/2005 Document Revised: 12/23/2014 Document Reviewed: 10/28/2014  [employer] Interactive Patient Education ©2016 [employer]      Depression / Suicide Risk    As you are discharged from this [facility], it is important to learn how to keep safe from harming yourself.    Recognize the warning signs:  · Abrupt changes in personality, positive or negative- including increase in energy   · Giving away possessions  · Change in eating patterns- significant weight changes-  positive or negative  · Change in sleeping patterns- unable to sleep or sleeping all the time   · Unwillingness or inability to communicate  · Depression  · Unusual sadness, discouragement and loneliness  · Talk of wanting to die  · Neglect of personal appearance   · Rebelliousness- reckless behavior  · Withdrawal from people/activities they love  · Confusion- inability to concentrate     If you or a loved one observes any of these behaviors or has concerns about self-harm, here's what you can do:  · Talk about it- your feelings and reasons for harming yourself  · Remove any means that you might use to hurt yourself (examples: pills, rope, extension cords, firearm)  · Get professional help from the community (Mental Health, Substance Abuse, psychological counseling)  · Do not be alone:Call your Safe Contact- someone whom you trust who will be there for you.  · Call your local CRISIS HOTLINE 873-3713 or 451-290-4891  · Call your local Children's Mobile Crisis Response Team [place] (978) 839-7862 or www.Browsarity  · Call the toll free National Suicide Prevention Hotlines   · National Suicide Prevention Lifeline 134-029-JLBT (1584)  · National Hope Line Network 800-SUICIDE (420-9247)

## 2017-02-01 NOTE — PROGRESS NOTES
Incontinent of bowel. Pt cleaned, linens changed, repositioned for comfort. Medicated for assumed pain--see MAR. Tylenol suppository given per MAR for fever 101.

## 2017-02-01 NOTE — PROGRESS NOTES
2030: Pt assessed. Full bed bath and linen changed per family request. Pt does not appear to be in any pain at the moment. Discussed POC through the night. Family states that the only time they notice pain is when she begins to cough and requests that when she does begin to cough, morphine be provided. Educated pt on subtle cues of discomfort and to inform RN if pt appears to be in any form of discomfort. All family verbalized understanding. All questions and concerns addressed.   2145: Pt began to cough. Family requesting morphine. When RN entered with morphine, pt had stopped coughing and family refused morphine administration. 10mg wasted.   2330: pt repositioned. No needs at this time.   0130: Pt repositioned. No needs at this time.   0300: Pt cleaned of incontinent BM. Repositioned. No s/s of distress or discomfort.   0510: Pt brother states that he feels that pt has a fever. Temp checked and pt medicated with prn tylenol suppository per family request. Pt also medicated with morphine as pt respiration rate has mildly increased and pt is much more awake. Pt flushed and mildly diaphoretic at this time.   0600: Pt now sleeping with no s/s of discomfort.

## 2017-02-01 NOTE — CARE PLAN
Problem: Knowledge Deficit  Goal: Knowledge of disease process/condition, treatment plan, diagnostic tests, and medications will improve  Family at the bedside. Educated on comfort care orders available through the night and subtle signs of discomfort. Family verbalized understanding.     Problem: Skin Integrity  Goal: Risk for impaired skin integrity will decrease  Q2h turns in effect. Pt given bed bath and cleaned of incontinence PRN.

## 2017-02-01 NOTE — DISCHARGE PLANNING
Transportation has been arranged to discharge the patient home via [facility] at 1000 via [facility]. SW on floor Charisse has been notified.

## 2017-02-01 NOTE — DISCHARGE PLANNING
DELIO Mcqueen tried to contact pt's sister, Adriana but there was no answer so she left voicemail.  DELIO then contacted pt's brother and POA, Isa who gave verbal permission to send referral to [facility] as they have already completed paperwork and agreed that [facility] transport at 1000 would work.  DELIO notified bs [facility] of [facility]  time and [facility].  DELIO faxed transport form and PCS form to [facility] for transportation request 1000.  DELIO also contacted Olivia at [facility] at [phone] to let her know the  time by [facility].  Olivia stated they will have someone at the home shortly after  time of 1000.

## 2017-02-01 NOTE — DISCHARGE PLANNING
[facility] received PCS and transport form.  [facility] has called [facility] to request authorization for transportation. The PCS form and face sheet has been sent to [facility].

## 2017-02-01 NOTE — PROGRESS NOTES
PALLIATIVE CARE FOLLOW UP:  Call placed to patient's son Isa/DPOA-HC to f/u on meeting with [facility] hospice and [facility]. He stated they would like to get patient back home on service with [facility] in the am if possible. Notified Dr. Holly and DELIO Mcqueen via .    Thank you for allowing Palliative Care to follow this patient. Please contact us at  with any questions.

## 2017-02-01 NOTE — DISCHARGE PLANNING
[facility]S received a hospice Choice form. Per the choice form the referral has been sent to [facility]

## 2017-02-01 NOTE — DISCHARGE PLANNING
DELIO Mcqueen spoke with LINDA Moss requesting he come in to sign some paperwork.  He stated he will be there between 0900 and 0930.  DELIO let bs RN Jailene know and requested the POLST be ready for his signature.  DELIO will meet with youngest brother by 0900 per RN request.

## 2017-02-01 NOTE — PROGRESS NOTES
Updates received from Jeannie MACEDO. Pt resting in bed with no s/s of distress. Family at the bedside requesting cot for a family member to stay. Cot will be provided. No further needs at this time.

## 2017-02-02 NOTE — DISCHARGE SUMMARY
DISCHARGE DIAGNOSES:  1.  Progressive dementia with functional decline.  2.  Developmental delay.  3.  Acute respiratory failure.  4.  Dysphagia.  5.  Vascular dementia.  6.  Bronchospasm, acute.  7.  Thrombocytopenia.  8.  Gastroesophageal reflux disease without esophagitis.  9.  Do not resuscitate code status.    HOSPITAL COURSE:  The patient is a 66-year-old female who was at home on   hospice being cared for by her sister.  However, patient continued to have   progressive functional decline and did have difficulty swallowing.  She did   develop shortness of breath and increased work of breathing and sister became   concerned and brought her to the hospital to be evaluated.  Here in the   emergency department, she was found to be hypoxic and required 2 liters   oxygen.  She was admitted to the hospital and treated with intravenous   antibiotic therapy until further plans and decision making could be made by   family and patient remained nonverbal during her hospitalization, which is at   her baseline.  Her breathing did improve with supplemental oxygen and   respiratory therapy, but she was unable to follow any cues for swallowing and   it was noted that she had have lack of oral nutrition for several days.  The   patient's poor prognosis was discussed with family in detail by myself as well   as our palliative care team.  Patient's brother is her power of .  I   did discuss the case with him and I did meet with him in person on date of   discharge.  After discussion with family members as well as patient's sister   and brother who is her power of , a decision was made for patient to   resume hospice and discharged back home.  Patient was placed on comfort care   during her hospital stay and hospice arrangements were made.    DISPOSITION:  The patient is discharged home with hospice.    FOLLOWUP:  Followup is with [facility] hosp[facility].    Time of discharge was 48 minutes, setting up referrals as  well as   communicating with family as well as hospice and setting up transport.       ____________________________________     MD PAYAM BACA / AGA    DD:  02/01/2017 11:21:23  DT:  02/02/2017 07:18:44    D#:  [id number]  Job#:  [id number]

## 2017-02-04 LAB
BACTERIA BLD CULT: NORMAL
SIGNIFICANT IND 70042: NORMAL
SITE SITE: NORMAL
SOURCE SOURCE: NORMAL

## 2017-02-05 LAB
BACTERIA BLD CULT: NORMAL
SIGNIFICANT IND 70042: NORMAL
SITE SITE: NORMAL
SOURCE SOURCE: NORMAL

## 2017-03-09 ENCOUNTER — APPOINTMENT (OUTPATIENT)
Dept: MEDICAL GROUP | Facility: MEDICAL CENTER | Age: 67
End: 2017-03-09
Payer: MEDICARE

## 2021-01-15 DIAGNOSIS — Z23 NEED FOR VACCINATION: ICD-10-CM

## 2023-09-16 NOTE — DISCHARGE PLANNING
DELIO Mcqueen called [facility] [phone] as she got a message from [employer] that the family would like pt to discharge this morning with them.  DELIO got the answering service and will call back at 8 am and then follow up with the family.     Yes